# Patient Record
Sex: FEMALE | URBAN - METROPOLITAN AREA
[De-identification: names, ages, dates, MRNs, and addresses within clinical notes are randomized per-mention and may not be internally consistent; named-entity substitution may affect disease eponyms.]

---
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
## 2024-11-26 ENCOUNTER — OFFICE VISIT (OUTPATIENT)
Dept: PRIMARY CARE | Facility: CLINIC | Age: 59
End: 2024-11-26
Payer: MEDICAID

## 2024-11-26 VITALS
BODY MASS INDEX: 29.66 KG/M2 | HEIGHT: 67 IN | HEART RATE: 87 BPM | SYSTOLIC BLOOD PRESSURE: 126 MMHG | DIASTOLIC BLOOD PRESSURE: 80 MMHG | OXYGEN SATURATION: 96 % | WEIGHT: 189 LBS

## 2024-11-26 DIAGNOSIS — G43.909 MIGRAINE WITHOUT STATUS MIGRAINOSUS, NOT INTRACTABLE, UNSPECIFIED MIGRAINE TYPE: Primary | ICD-10-CM

## 2024-11-26 DIAGNOSIS — Z12.31 VISIT FOR SCREENING MAMMOGRAM: ICD-10-CM

## 2024-11-26 DIAGNOSIS — K21.9 GASTROESOPHAGEAL REFLUX DISEASE WITHOUT ESOPHAGITIS: ICD-10-CM

## 2024-11-26 DIAGNOSIS — Z01.419 WELL WOMAN EXAM: ICD-10-CM

## 2024-11-26 DIAGNOSIS — M54.2 NECK PAIN: ICD-10-CM

## 2024-11-26 DIAGNOSIS — K04.7 DENTAL INFECTION: ICD-10-CM

## 2024-11-26 DIAGNOSIS — F41.9 ANXIETY: ICD-10-CM

## 2024-11-26 PROCEDURE — 3008F BODY MASS INDEX DOCD: CPT | Performed by: PHYSICIAN ASSISTANT

## 2024-11-26 PROCEDURE — 99215 OFFICE O/P EST HI 40 MIN: CPT | Performed by: PHYSICIAN ASSISTANT

## 2024-11-26 PROCEDURE — 99205 OFFICE O/P NEW HI 60 MIN: CPT | Performed by: PHYSICIAN ASSISTANT

## 2024-11-26 PROCEDURE — 1036F TOBACCO NON-USER: CPT | Performed by: PHYSICIAN ASSISTANT

## 2024-11-26 RX ORDER — ACETAMINOPHEN 325 MG/1
325 TABLET ORAL EVERY 6 HOURS PRN
COMMUNITY

## 2024-11-26 RX ORDER — OMEPRAZOLE 40 MG/1
40 CAPSULE, DELAYED RELEASE ORAL
Qty: 90 CAPSULE | Refills: 1 | Status: SHIPPED | OUTPATIENT
Start: 2024-11-26 | End: 2025-05-25

## 2024-11-26 RX ORDER — OMEPRAZOLE 40 MG/1
40 CAPSULE, DELAYED RELEASE ORAL
COMMUNITY
End: 2024-11-26 | Stop reason: SDUPTHER

## 2024-11-26 RX ORDER — BUSPIRONE HYDROCHLORIDE 5 MG/1
5 TABLET ORAL 2 TIMES DAILY
Qty: 60 TABLET | Refills: 2 | Status: SHIPPED | OUTPATIENT
Start: 2024-11-26 | End: 2025-02-24

## 2024-11-26 RX ORDER — AMOXICILLIN 875 MG/1
875 TABLET, FILM COATED ORAL 2 TIMES DAILY
Qty: 20 TABLET | Refills: 0 | Status: SHIPPED | OUTPATIENT
Start: 2024-11-26 | End: 2024-12-06

## 2024-11-26 ASSESSMENT — ENCOUNTER SYMPTOMS
LOSS OF SENSATION IN FEET: 0
OCCASIONAL FEELINGS OF UNSTEADINESS: 0
DEPRESSION: 0

## 2024-11-26 ASSESSMENT — PATIENT HEALTH QUESTIONNAIRE - PHQ9
10. IF YOU CHECKED OFF ANY PROBLEMS, HOW DIFFICULT HAVE THESE PROBLEMS MADE IT FOR YOU TO DO YOUR WORK, TAKE CARE OF THINGS AT HOME, OR GET ALONG WITH OTHER PEOPLE: SOMEWHAT DIFFICULT
SUM OF ALL RESPONSES TO PHQ9 QUESTIONS 1 AND 2: 1
1. LITTLE INTEREST OR PLEASURE IN DOING THINGS: NOT AT ALL
2. FEELING DOWN, DEPRESSED OR HOPELESS: SEVERAL DAYS

## 2024-11-26 ASSESSMENT — COLUMBIA-SUICIDE SEVERITY RATING SCALE - C-SSRS
1. IN THE PAST MONTH, HAVE YOU WISHED YOU WERE DEAD OR WISHED YOU COULD GO TO SLEEP AND NOT WAKE UP?: NO
6. HAVE YOU EVER DONE ANYTHING, STARTED TO DO ANYTHING, OR PREPARED TO DO ANYTHING TO END YOUR LIFE?: NO
2. HAVE YOU ACTUALLY HAD ANY THOUGHTS OF KILLING YOURSELF?: NO

## 2024-11-26 ASSESSMENT — PAIN SCALES - GENERAL: PAINLEVEL_OUTOF10: 9

## 2024-12-09 ENCOUNTER — APPOINTMENT (OUTPATIENT)
Dept: PRIMARY CARE | Facility: CLINIC | Age: 59
End: 2024-12-09
Payer: MEDICAID
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
## 2024-12-24 ENCOUNTER — EVALUATION (OUTPATIENT)
Dept: PHYSICAL THERAPY | Facility: CLINIC | Age: 59
End: 2024-12-24
Payer: MEDICAID

## 2024-12-24 DIAGNOSIS — G89.29 CHRONIC BILATERAL THORACIC BACK PAIN: ICD-10-CM

## 2024-12-24 DIAGNOSIS — M54.2 NECK PAIN: Primary | ICD-10-CM

## 2024-12-24 DIAGNOSIS — M54.6 CHRONIC BILATERAL THORACIC BACK PAIN: ICD-10-CM

## 2024-12-24 PROCEDURE — 97162 PT EVAL MOD COMPLEX 30 MIN: CPT | Mod: GP | Performed by: PHYSICAL THERAPIST

## 2024-12-24 SDOH — ECONOMIC STABILITY: GENERAL: QUALITY OF LIFE: GOOD

## 2024-12-24 ASSESSMENT — ENCOUNTER SYMPTOMS
QUALITY: DISCOMFORT
QUALITY: THROBBING
QUALITY: TIGHT
QUALITY: RADIATING
QUALITY: DULL ACHE
PAIN SCALE: 5
QUALITY: SHARP
PAIN SCALE AT HIGHEST: 10
PAIN SCALE AT LOWEST: 4
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
## 2025-01-02 ENCOUNTER — OFFICE VISIT (OUTPATIENT)
Dept: PRIMARY CARE | Facility: CLINIC | Age: 60
End: 2025-01-02
Payer: MEDICAID

## 2025-01-02 ENCOUNTER — APPOINTMENT (OUTPATIENT)
Dept: OBSTETRICS AND GYNECOLOGY | Facility: CLINIC | Age: 60
End: 2025-01-02
Payer: MEDICAID

## 2025-01-02 VITALS
OXYGEN SATURATION: 96 % | HEART RATE: 93 BPM | BODY MASS INDEX: 29.63 KG/M2 | DIASTOLIC BLOOD PRESSURE: 72 MMHG | WEIGHT: 189.2 LBS | SYSTOLIC BLOOD PRESSURE: 102 MMHG

## 2025-01-02 DIAGNOSIS — K04.7 DENTAL INFECTION: ICD-10-CM

## 2025-01-02 DIAGNOSIS — K21.9 GASTROESOPHAGEAL REFLUX DISEASE WITHOUT ESOPHAGITIS: ICD-10-CM

## 2025-01-02 DIAGNOSIS — Z12.31 VISIT FOR SCREENING MAMMOGRAM: ICD-10-CM

## 2025-01-02 DIAGNOSIS — M54.2 NECK PAIN: ICD-10-CM

## 2025-01-02 DIAGNOSIS — F41.9 ANXIETY: ICD-10-CM

## 2025-01-02 DIAGNOSIS — L72.0 EPIDERMOID CYST OF FACE: ICD-10-CM

## 2025-01-02 DIAGNOSIS — G43.909 MIGRAINE WITHOUT STATUS MIGRAINOSUS, NOT INTRACTABLE, UNSPECIFIED MIGRAINE TYPE: Primary | ICD-10-CM

## 2025-01-02 DIAGNOSIS — D64.9 ANEMIA, UNSPECIFIED TYPE: ICD-10-CM

## 2025-01-02 DIAGNOSIS — Z23 NEED FOR TDAP VACCINATION: ICD-10-CM

## 2025-01-02 PROCEDURE — 1036F TOBACCO NON-USER: CPT | Performed by: PHYSICIAN ASSISTANT

## 2025-01-02 PROCEDURE — 99214 OFFICE O/P EST MOD 30 MIN: CPT | Performed by: PHYSICIAN ASSISTANT

## 2025-01-02 PROCEDURE — 99214 OFFICE O/P EST MOD 30 MIN: CPT | Mod: 25 | Performed by: PHYSICIAN ASSISTANT

## 2025-01-02 PROCEDURE — 90471 IMMUNIZATION ADMIN: CPT | Performed by: PHYSICIAN ASSISTANT

## 2025-01-02 ASSESSMENT — LIFESTYLE VARIABLES
HOW OFTEN DO YOU HAVE A DRINK CONTAINING ALCOHOL: NEVER
HOW MANY STANDARD DRINKS CONTAINING ALCOHOL DO YOU HAVE ON A TYPICAL DAY: PATIENT DOES NOT DRINK
HOW OFTEN DO YOU HAVE SIX OR MORE DRINKS ON ONE OCCASION: NEVER
AUDIT-C TOTAL SCORE: 0
SKIP TO QUESTIONS 9-10: 1

## 2025-01-02 ASSESSMENT — PATIENT HEALTH QUESTIONNAIRE - PHQ9
1. LITTLE INTEREST OR PLEASURE IN DOING THINGS: NOT AT ALL
2. FEELING DOWN, DEPRESSED OR HOPELESS: NOT AT ALL
SUM OF ALL RESPONSES TO PHQ9 QUESTIONS 1 AND 2: 0

## 2025-01-02 ASSESSMENT — ENCOUNTER SYMPTOMS
DEPRESSION: 0
OCCASIONAL FEELINGS OF UNSTEADINESS: 0
LOSS OF SENSATION IN FEET: 0

## 2025-01-02 ASSESSMENT — PAIN SCALES - GENERAL: PAINLEVEL_OUTOF10: 4

## 2025-01-06 ENCOUNTER — TREATMENT (OUTPATIENT)
Dept: PHYSICAL THERAPY | Facility: CLINIC | Age: 60
End: 2025-01-06
Payer: MEDICAID

## 2025-01-06 DIAGNOSIS — M54.2 NECK PAIN: ICD-10-CM

## 2025-01-06 DIAGNOSIS — M54.6 CHRONIC BILATERAL THORACIC BACK PAIN: ICD-10-CM

## 2025-01-06 DIAGNOSIS — G89.29 CHRONIC BILATERAL THORACIC BACK PAIN: ICD-10-CM

## 2025-01-06 PROCEDURE — 97110 THERAPEUTIC EXERCISES: CPT | Mod: GP,CQ

## 2025-01-06 PROCEDURE — 97140 MANUAL THERAPY 1/> REGIONS: CPT | Mod: GP,CQ

## 2025-01-06 ASSESSMENT — PAIN - FUNCTIONAL ASSESSMENT: PAIN_FUNCTIONAL_ASSESSMENT: 0-10

## 2025-01-06 ASSESSMENT — PAIN SCALES - GENERAL: PAINLEVEL_OUTOF10: 3

## 2025-01-06 NOTE — PROGRESS NOTES
"Physical Therapy Treatment    Patient Name: Merle Joshua  MRN: [id number]  Today's Date: 1/6/2025  Time Calculation  Start Time: 1430  Stop Time: 1515  Time Calculation (min): 45 min  PT Therapeutic Procedures Time Entry  Manual Therapy Time Entry: 30  Therapeutic Exercise Time Entry: 8    Insurance:  Visit number: 2 of 9  Authorization info: AUTH AFTER 30 VISITS / 100% COVERAGE / 30V   Insurance Type: Payor: [employer] NON-MEDICARE / Plan: [employer] NON-MEDICARE / Product Type: *No Product type* /     Current Problem   1. Neck pain  Follow Up In Physical Therapy      2. Chronic bilateral thoracic back pain  Follow Up In Physical Therapy          Subjective   General    Patient reports she has a headache today and has been having trouble sleeping d/t head and neck pain. Reports no adverse reactions to HEP. States she has trouble turning head to side<>side.       Precautions:   Anxiousness     Pain   Pain Assessment: 0-10  0-10 (Numeric) Pain Score: 3    Post Treatment Pain Level   \"I feel so much better\"    Objective   TTP to cervical structures    Treatments:  Therapeutic Exercise:  Therapeutic Exercise  Therapeutic Exercise Performed: Yes  Therapeutic Exercise Activity 1: UBE 3' fwd/bwd ea  Therapeutic Exercise Activity 2: cervical ISO chin tuck 10x2    Manual:  Manual Therapy  Manual Therapy Performed: Yes  Manual Therapy Activity 1: STM with light TPR to cervical structures at: upper traps, levator scaps, cervical paraspinals  Manual Therapy Activity 2: gentle subocciptial release    Cervical MHP post manual for relaxation      Assessment   Assessment:    Focus and emphasis on pain relief, reduction of muscle guarding around cervical spine to provide relief of tension and discomfort. Decreased pain after manual work. Very guarded and stiff movements at head noted.     Plan:    Manual, light cervical and scap strengthening, thoracic mobility    OP EDUCATION:   Use of heating pad, performance of " HEP    Goals:   Active       PT Problem       Pt will be 50% IND with HEP in 4 weeks in order to progress with therapy.        Start:  12/24/24    Expected End:  01/28/25            Pt will reduce pain levels to no more than 3/10 in 4 weeks in order to improve sleep and self care tasks.        Start:  12/24/24    Expected End:  01/28/25            Pt will demonstrate full functional cervical ROM in 4 weeks for self care tasks.       Start:  12/24/24    Expected End:  01/28/25            Pt will demonstrate subjective improvement of ADLs and recreational activities through improved score of 10 on NDI in 4 weeks.        Start:  12/24/24    Expected End:  01/28/25               PT Problem       Pt will be 100% IND with HEP in 8 weeks in order to maintain progress with therapy.         Start:  12/24/24    Expected End:  02/27/25            Pt will reduce pain levels to no more than 1/10 in 8 weeks in order to improve sleep, cooking/cleaning and self care tasks.        Start:  12/24/24    Expected End:  02/27/25            Pt will improve B scapular/GH strength to 5/5 in 8 weeks in order to improve strength required to lift objects at home including groceries and to improve cleaning tasks.         Start:  12/24/24    Expected End:  02/27/25            Pt will demonstrate subjective improvement of ADLs and recreational activities through improved score of 0 on NDI in 8 weeks.        Start:  12/24/24    Expected End:  02/27/25

## 2025-01-08 DIAGNOSIS — L72.0 EPIDERMOID CYST OF FACE: Primary | ICD-10-CM

## 2025-01-10 ENCOUNTER — LAB (OUTPATIENT)
Dept: LAB | Facility: LAB | Age: 60
End: 2025-01-10
Payer: MEDICAID

## 2025-01-10 ENCOUNTER — HOSPITAL ENCOUNTER (OUTPATIENT)
Dept: RADIOLOGY | Facility: HOSPITAL | Age: 60
Discharge: HOME | End: 2025-01-10
Payer: MEDICAID

## 2025-01-10 DIAGNOSIS — G43.909 MIGRAINE WITHOUT STATUS MIGRAINOSUS, NOT INTRACTABLE, UNSPECIFIED MIGRAINE TYPE: ICD-10-CM

## 2025-01-10 DIAGNOSIS — Z12.31 VISIT FOR SCREENING MAMMOGRAM: ICD-10-CM

## 2025-01-10 DIAGNOSIS — D64.9 ANEMIA, UNSPECIFIED TYPE: ICD-10-CM

## 2025-01-10 LAB
25(OH)D3 SERPL-MCNC: 24 NG/ML (ref 30–100)
ALBUMIN SERPL BCP-MCNC: 4.3 G/DL (ref 3.4–5)
ALP SERPL-CCNC: 81 U/L (ref 33–110)
ALT SERPL W P-5'-P-CCNC: 16 U/L (ref 7–45)
ANION GAP SERPL CALCULATED.3IONS-SCNC: 10 MMOL/L (ref 10–20)
AST SERPL W P-5'-P-CCNC: 18 U/L (ref 9–39)
BASOPHILS # BLD AUTO: 0.07 X10*3/UL (ref 0–0.1)
BASOPHILS NFR BLD AUTO: 1.1 %
BILIRUB SERPL-MCNC: 0.4 MG/DL (ref 0–1.2)
BUN SERPL-MCNC: 16 MG/DL (ref 6–23)
CALCIUM SERPL-MCNC: 9.6 MG/DL (ref 8.6–10.3)
CHLORIDE SERPL-SCNC: 104 MMOL/L (ref 98–107)
CHOLEST SERPL-MCNC: 177 MG/DL (ref 0–199)
CHOLEST/HDLC SERPL: 4.1 {RATIO}
CO2 SERPL-SCNC: 29 MMOL/L (ref 21–32)
CREAT SERPL-MCNC: 0.83 MG/DL (ref 0.5–1.05)
EGFRCR SERPLBLD CKD-EPI 2021: 82 ML/MIN/1.73M*2
EOSINOPHIL # BLD AUTO: 0.12 X10*3/UL (ref 0–0.7)
EOSINOPHIL NFR BLD AUTO: 1.8 %
ERYTHROCYTE [DISTWIDTH] IN BLOOD BY AUTOMATED COUNT: 13.5 % (ref 11.5–14.5)
FERRITIN SERPL-MCNC: 199 NG/ML (ref 8–150)
GLUCOSE SERPL-MCNC: 94 MG/DL (ref 74–99)
HCT VFR BLD AUTO: 36.2 % (ref 36–46)
HDLC SERPL-MCNC: 43.2 MG/DL
HGB BLD-MCNC: 11.8 G/DL (ref 12–16)
IMM GRANULOCYTES # BLD AUTO: 0.01 X10*3/UL (ref 0–0.7)
IMM GRANULOCYTES NFR BLD AUTO: 0.2 % (ref 0–0.9)
IRON SATN MFR SERPL: 27 % (ref 25–45)
IRON SERPL-MCNC: 95 UG/DL (ref 35–150)
LDLC SERPL CALC-MCNC: 111 MG/DL
LYMPHOCYTES # BLD AUTO: 2.14 X10*3/UL (ref 1.2–4.8)
LYMPHOCYTES NFR BLD AUTO: 32.1 %
MCH RBC QN AUTO: 28.7 PG (ref 26–34)
MCHC RBC AUTO-ENTMCNC: 32.6 G/DL (ref 32–36)
MCV RBC AUTO: 88 FL (ref 80–100)
MONOCYTES # BLD AUTO: 0.51 X10*3/UL (ref 0.1–1)
MONOCYTES NFR BLD AUTO: 7.7 %
NEUTROPHILS # BLD AUTO: 3.81 X10*3/UL (ref 1.2–7.7)
NEUTROPHILS NFR BLD AUTO: 57.1 %
NON HDL CHOLESTEROL: 134 MG/DL (ref 0–149)
NRBC BLD-RTO: 0 /100 WBCS (ref 0–0)
PLATELET # BLD AUTO: 297 X10*3/UL (ref 150–450)
POTASSIUM SERPL-SCNC: 4.3 MMOL/L (ref 3.5–5.3)
PROT SERPL-MCNC: 7 G/DL (ref 6.4–8.2)
RBC # BLD AUTO: 4.11 X10*6/UL (ref 4–5.2)
SODIUM SERPL-SCNC: 139 MMOL/L (ref 136–145)
TIBC SERPL-MCNC: 357 UG/DL (ref 240–445)
TRIGL SERPL-MCNC: 113 MG/DL (ref 0–149)
UIBC SERPL-MCNC: 262 UG/DL (ref 110–370)
VIT B12 SERPL-MCNC: 418 PG/ML (ref 211–911)
VLDL: 23 MG/DL (ref 0–40)
WBC # BLD AUTO: 6.7 X10*3/UL (ref 4.4–11.3)

## 2025-01-10 PROCEDURE — 83540 ASSAY OF IRON: CPT

## 2025-01-10 PROCEDURE — 80053 COMPREHEN METABOLIC PANEL: CPT

## 2025-01-10 PROCEDURE — 80061 LIPID PANEL: CPT

## 2025-01-10 PROCEDURE — 82306 VITAMIN D 25 HYDROXY: CPT

## 2025-01-10 PROCEDURE — 82728 ASSAY OF FERRITIN: CPT

## 2025-01-10 PROCEDURE — 85025 COMPLETE CBC W/AUTO DIFF WBC: CPT

## 2025-01-10 PROCEDURE — 83550 IRON BINDING TEST: CPT

## 2025-01-10 PROCEDURE — 77067 SCR MAMMO BI INCL CAD: CPT

## 2025-01-10 PROCEDURE — 82607 VITAMIN B-12: CPT

## 2025-01-13 ENCOUNTER — TREATMENT (OUTPATIENT)
Dept: PHYSICAL THERAPY | Facility: CLINIC | Age: 60
End: 2025-01-13
Payer: MEDICAID

## 2025-01-13 DIAGNOSIS — G89.29 CHRONIC BILATERAL THORACIC BACK PAIN: ICD-10-CM

## 2025-01-13 DIAGNOSIS — M54.2 NECK PAIN: ICD-10-CM

## 2025-01-13 DIAGNOSIS — M54.6 CHRONIC BILATERAL THORACIC BACK PAIN: ICD-10-CM

## 2025-01-13 PROCEDURE — 97110 THERAPEUTIC EXERCISES: CPT | Mod: GP | Performed by: PHYSICAL THERAPIST

## 2025-01-13 ASSESSMENT — PAIN SCALES - GENERAL: PAINLEVEL_OUTOF10: 6

## 2025-01-13 ASSESSMENT — PAIN - FUNCTIONAL ASSESSMENT: PAIN_FUNCTIONAL_ASSESSMENT: 0-10

## 2025-01-13 NOTE — PROGRESS NOTES
"Physical Therapy Treatment    Patient Name: Merle Joshua  MRN: [id number]  Today's Date: 1/13/2025  Time Calculation  Start Time: 0847  Stop Time: 0920  Time Calculation (min): 33 min  PT Therapeutic Procedures Time Entry  Therapeutic Exercise Time Entry: 33    Insurance:  Visit number: 3 of 9  Authorization info: AUTH AFTER 30 VISITS / 100% COVERAGE / 30V   Insurance Type: Payor: [employer]ICA[employer] / Plan: [employer] / Product Type: *No Product type* /     Current Problem   1. Neck pain  Follow Up In Physical Therapy      2. Chronic bilateral thoracic back pain  Follow Up In Physical Therapy          Subjective   General   General Comment: Pt presents reporting increased discomfort throughout upper thoracic into upper trap region.  Precautions:  Precautions  Precautions Comment: None  Pain   Pain Assessment: 0-10  0-10 (Numeric) Pain Score: 6  Post Treatment Pain Level 5    Objective   Overall tense quality of motion with reduction muscle control    Treatments:  Therapeutic Exercise:  Therapeutic Exercise  Therapeutic Exercise Performed: Yes  Therapeutic Exercise Activity 1: supine modified dead bug 10x  Therapeutic Exercise Activity 2: supine chin tuck 10x  Therapeutic Exercise Activity 3: supine scapular retraction 10x2  Therapeutic Exercise Activity 4: Supine PPT 10x2  Therapeutic Exercise Activity 5: seated bwd shoulder rolls 10x2  Therapeutic Exercise Activity 6: seated upper trap stretch 30\"x3 B  Therapeutic Exercise Activity 7: seated diaphragm breathing      Assessment   Assessment:    Pt presented with overall stiffness of motion with limitations secondary to pain despite gentle ROM activities performed this date, provided education on diaphragm breathing for pain control and nervous system regulation. Will continue with ROM and strengthening for stability reduced throughout cervical, GH, and thoracic regions.     Plan:    Trial of cervical traction?; Continue with " relaxation and diaphragm breathing and overall promotion of normal movement patterns     OP EDUCATION:   Diaphragm breathing for pain control     Goals:   Active       PT Problem       Pt will be 50% IND with HEP in 4 weeks in order to progress with therapy.        Start:  12/24/24    Expected End:  01/28/25            Pt will reduce pain levels to no more than 3/10 in 4 weeks in order to improve sleep and self care tasks.        Start:  12/24/24    Expected End:  01/28/25            Pt will demonstrate full functional cervical ROM in 4 weeks for self care tasks.       Start:  12/24/24    Expected End:  01/28/25            Pt will demonstrate subjective improvement of ADLs and recreational activities through improved score of 10 on NDI in 4 weeks.        Start:  12/24/24    Expected End:  01/28/25               PT Problem       Pt will be 100% IND with HEP in 8 weeks in order to maintain progress with therapy.         Start:  12/24/24    Expected End:  02/27/25            Pt will reduce pain levels to no more than 1/10 in 8 weeks in order to improve sleep, cooking/cleaning and self care tasks.        Start:  12/24/24    Expected End:  02/27/25            Pt will improve B scapular/GH strength to 5/5 in 8 weeks in order to improve strength required to lift objects at home including groceries and to improve cleaning tasks.         Start:  12/24/24    Expected End:  02/27/25            Pt will demonstrate subjective improvement of ADLs and recreational activities through improved score of 0 on NDI in 8 weeks.        Start:  12/24/24    Expected End:  02/27/25

## 2025-01-16 ENCOUNTER — APPOINTMENT (OUTPATIENT)
Dept: OBSTETRICS AND GYNECOLOGY | Facility: CLINIC | Age: 60
End: 2025-01-16
Payer: MEDICAID

## 2025-01-16 VITALS — DIASTOLIC BLOOD PRESSURE: 82 MMHG | SYSTOLIC BLOOD PRESSURE: 116 MMHG | WEIGHT: 192 LBS | BODY MASS INDEX: 30.07 KG/M2

## 2025-01-16 DIAGNOSIS — Z01.419 WELL WOMAN EXAM: ICD-10-CM

## 2025-01-16 ASSESSMENT — ENCOUNTER SYMPTOMS
EYES NEGATIVE: 0
CARDIOVASCULAR NEGATIVE: 0
NEUROLOGICAL NEGATIVE: 0
ALLERGIC/IMMUNOLOGIC NEGATIVE: 0
HEMATOLOGIC/LYMPHATIC NEGATIVE: 0
ENDOCRINE NEGATIVE: 0
PSYCHIATRIC NEGATIVE: 0
CONSTITUTIONAL NEGATIVE: 0
GASTROINTESTINAL NEGATIVE: 0
MUSCULOSKELETAL NEGATIVE: 0
RESPIRATORY NEGATIVE: 0

## 2025-01-16 NOTE — PROGRESS NOTES
Subjective   Merle Joshua is a 59 y.o. P6 ( x6) who presents for annual    Concerns today:  Feels like her vaginal odor is very responsive to food  No itching, burning, discomfort   No odor today    GynHx:  -Menses: post menopausal, no bleeding since, menopause early 50s  -Sexual Activity: not currently  -Sexual Concerns: no  -Contraception: N/A  -STI Screening: no  -Last pap:  Records reviewed on phone  NILM, HPV -  No history of abnormal paps    OBHx: P6,  x6  PMHx: migraines, GERD, anxiety    Objective   Physical Exam  Vitals:    25 0859   BP: 116/82      Gen: awake, alert  Head: NCAT  HEENT: moist mucus membranes  Pulm: breathing comfortably on room air  CV: warm and well-perfused  Abd: soft non distended  :   Normal vulvar exam  Mild vaginal atrophy  Normal cervix  Normal bimanual exam  Neuro: alert and oriented  Psych: appropriate affect     Assessment/Plan     Merle Joshua is a 59 y.o. P6 who presents today for an annual exam.     Health Maintenance  -Pap: due in   -Mammogram: up to date  -Colonoscopy: up to date, reports  normal in [place]  -Recent health maintenance labs with PCP  -Discussed normal vaginal odor and indy. If has episode of abnormal odor, can schedule follow up for vaginitis assessment.    RTC 1 year for annual    Shazia Bradford MD

## 2025-01-20 ENCOUNTER — TREATMENT (OUTPATIENT)
Dept: PHYSICAL THERAPY | Facility: CLINIC | Age: 60
End: 2025-01-20
Payer: MEDICAID

## 2025-01-20 DIAGNOSIS — M54.2 NECK PAIN: ICD-10-CM

## 2025-01-20 DIAGNOSIS — M54.6 CHRONIC BILATERAL THORACIC BACK PAIN: ICD-10-CM

## 2025-01-20 DIAGNOSIS — G89.29 CHRONIC BILATERAL THORACIC BACK PAIN: ICD-10-CM

## 2025-01-20 PROCEDURE — 97110 THERAPEUTIC EXERCISES: CPT | Mod: GP,CQ

## 2025-01-20 ASSESSMENT — PAIN - FUNCTIONAL ASSESSMENT: PAIN_FUNCTIONAL_ASSESSMENT: 0-10

## 2025-01-20 ASSESSMENT — PAIN SCALES - GENERAL: PAINLEVEL_OUTOF10: 1

## 2025-01-20 NOTE — PROGRESS NOTES
Physical Therapy Treatment    Patient Name: Merle Joshua  MRN: [id number]  Today's Date: 1/20/2025  Time Calculation  Start Time: 0915  Stop Time: 0955  Time Calculation (min): 40 min  PT Therapeutic Procedures Time Entry  Therapeutic Exercise Time Entry: 40    Insurance:  Visit number: 4 of 9  Authorization info: AUTH AFTER 30 VISITS / 100% COVERAGE / 30V   Insurance Type: Payor: [employer] MEDICAID / Plan: [employer] MEDICAID / Product Type: *No Product type* /     Current Problem   1. Neck pain  Follow Up In Physical Therapy      2. Chronic bilateral thoracic back pain  Follow Up In Physical Therapy          Subjective   General    Patient reports soreness in neck which radiates to L shoulder, states she continues to use OTC medication for pain. Reports she was sore after last session but feels good today.    Precautions:   none    Pain   Pain Assessment: 0-10  0-10 (Numeric) Pain Score: 1  Pain Location: Neck    Post Treatment Pain Level   No change    Objective   Improved ability to perform diaphragm breathing with little/no cues.     Posture: L shld elevation with rounded shld and slight winging of bilat scaps (L>R)    Treatments:  Therapeutic Exercise:  Therapeutic Exercise  Therapeutic Exercise Performed: Yes  Therapeutic Exercise Activity 1: h/l diaphragm breathing 10x3  Therapeutic Exercise Activity 2: h/l diaphragm breathing with PPT 10x3  Therapeutic Exercise Activity 3: h/l diaphragm breathing with ISO hip ADD 10x3  Therapeutic Exercise Activity 4: supine chin tuck 10x2  Therapeutic Exercise Activity 5: seated SB diaphragm breathing 10x3  Therapeutic Exercise Activity 6: seated SB bwd shld rolls 10x3  Therapeutic Exercise Activity 7: seated SB scap squeezes 10x3      Assessment   Assessment:    Patient is progressing towards goals, tolerated session well this date with no adverse reactions or increased pain. Focus on diaphragm breathing to reduce neural tension as well as basic  cervical and scapular movements to reduce tension/tenderness. Will continue to progress per supervising therapist.     Plan:    Trial of cervical traction?; Continue with relaxation and diaphragm breathing and overall promotion of normal movement patterns        OP EDUCATION:   Heat, rest, sophie breathing    Goals:   Active       PT Problem       Pt will be 50% IND with HEP in 4 weeks in order to progress with therapy.        Start:  12/24/24    Expected End:  01/28/25            Pt will reduce pain levels to no more than 3/10 in 4 weeks in order to improve sleep and self care tasks.        Start:  12/24/24    Expected End:  01/28/25            Pt will demonstrate full functional cervical ROM in 4 weeks for self care tasks.       Start:  12/24/24    Expected End:  01/28/25            Pt will demonstrate subjective improvement of ADLs and recreational activities through improved score of 10 on NDI in 4 weeks.        Start:  12/24/24    Expected End:  01/28/25               PT Problem       Pt will be 100% IND with HEP in 8 weeks in order to maintain progress with therapy.         Start:  12/24/24    Expected End:  02/27/25            Pt will reduce pain levels to no more than 1/10 in 8 weeks in order to improve sleep, cooking/cleaning and self care tasks.        Start:  12/24/24    Expected End:  02/27/25            Pt will improve B scapular/GH strength to 5/5 in 8 weeks in order to improve strength required to lift objects at home including groceries and to improve cleaning tasks.         Start:  12/24/24    Expected End:  02/27/25            Pt will demonstrate subjective improvement of ADLs and recreational activities through improved score of 0 on NDI in 8 weeks.        Start:  12/24/24    Expected End:  02/27/25

## 2025-01-27 ENCOUNTER — TREATMENT (OUTPATIENT)
Dept: PHYSICAL THERAPY | Facility: CLINIC | Age: 60
End: 2025-01-27
Payer: MEDICAID

## 2025-01-27 DIAGNOSIS — M54.6 CHRONIC BILATERAL THORACIC BACK PAIN: ICD-10-CM

## 2025-01-27 DIAGNOSIS — G89.29 CHRONIC BILATERAL THORACIC BACK PAIN: ICD-10-CM

## 2025-01-27 DIAGNOSIS — M54.2 NECK PAIN: ICD-10-CM

## 2025-01-27 PROCEDURE — 97110 THERAPEUTIC EXERCISES: CPT | Mod: GP,CQ

## 2025-01-27 ASSESSMENT — PAIN SCALES - GENERAL: PAINLEVEL_OUTOF10: 2

## 2025-01-27 ASSESSMENT — PAIN - FUNCTIONAL ASSESSMENT: PAIN_FUNCTIONAL_ASSESSMENT: 0-10

## 2025-01-27 NOTE — PROGRESS NOTES
"Physical Therapy Treatment    Patient Name: Merle Joshua  MRN: [id number]  Today's Date: 1/27/2025  Time Calculation  Start Time: 0830  Stop Time: 0914  Time Calculation (min): 44 min  PT Therapeutic Procedures Time Entry  Therapeutic Exercise Time Entry: 44    Insurance:  Visit number: 5 of 9  Authorization info: AUTH AFTER 30 VISITS / 100% COVERAGE / 30V   Insurance Type: Payor: [employer] MEDICAID / Plan: [employer] MEDICAID / Product Type: *No Product type* /     Current Problem   1. Neck pain  Follow Up In Physical Therapy      2. Chronic bilateral thoracic back pain  Follow Up In Physical Therapy          Subjective   General    Patient reports she felt \"good and relaxed\" after last session, states she has slight pressure in her head but thinks ultimately she is progressing.  Doing well with HEP.    Precautions:   None    Pain   Pain Assessment: 0-10  0-10 (Numeric) Pain Score: 2  Pain Location:  (head and neck)    Post Treatment Pain Level   0/10- \"that pressure in my head is gone\"    Objective   Tightness to bilat upper traps     Treatments:  Therapeutic Exercise:  Therapeutic Exercise  Therapeutic Exercise Performed: Yes  Therapeutic Exercise Activity 1: UBE 3' fwd/3' bwd  Therapeutic Exercise Activity 2: seated UT stretch shld distract 3 x 30\" H B  Therapeutic Exercise Activity 3: h/l diaphragm breathing with ISO hip ADD 10x3  Therapeutic Exercise Activity 4: h/l diaphragm breathing with alt MIP 10x2  Therapeutic Exercise Activity 5: seated SB diaphragm breathing with PPT 10x3  Therapeutic Exercise Activity 6: seated SB diaphragm breathing with PPT 10x3  Therapeutic Exercise Activity 7: seated SB scap squeezes 10x3      Assessment   Assessment:    Patient seems to really be finding benefit from PT interventions, no pain symptoms after interventions today with improved ability to perform diaphragmatic breathing during each task. Continued stiffness with movements but able to perform. " Will continue to progress per patient tolerance.    Plan:    See above    OP EDUCATION:   Completion of HEP 2x per day, use of heat      Goals:   Active       PT Problem       Pt will be 50% IND with HEP in 4 weeks in order to progress with therapy.        Start:  12/24/24    Expected End:  01/28/25            Pt will reduce pain levels to no more than 3/10 in 4 weeks in order to improve sleep and self care tasks.        Start:  12/24/24    Expected End:  01/28/25            Pt will demonstrate full functional cervical ROM in 4 weeks for self care tasks.       Start:  12/24/24    Expected End:  01/28/25            Pt will demonstrate subjective improvement of ADLs and recreational activities through improved score of 10 on NDI in 4 weeks.        Start:  12/24/24    Expected End:  01/28/25               PT Problem       Pt will be 100% IND with HEP in 8 weeks in order to maintain progress with therapy.         Start:  12/24/24    Expected End:  02/27/25            Pt will reduce pain levels to no more than 1/10 in 8 weeks in order to improve sleep, cooking/cleaning and self care tasks.        Start:  12/24/24    Expected End:  02/27/25            Pt will improve B scapular/GH strength to 5/5 in 8 weeks in order to improve strength required to lift objects at home including groceries and to improve cleaning tasks.         Start:  12/24/24    Expected End:  02/27/25            Pt will demonstrate subjective improvement of ADLs and recreational activities through improved score of 0 on NDI in 8 weeks.        Start:  12/24/24    Expected End:  02/27/25

## 2025-02-03 ENCOUNTER — TREATMENT (OUTPATIENT)
Dept: PHYSICAL THERAPY | Facility: CLINIC | Age: 60
End: 2025-02-03
Payer: MEDICAID

## 2025-02-03 DIAGNOSIS — G89.29 CHRONIC BILATERAL THORACIC BACK PAIN: ICD-10-CM

## 2025-02-03 DIAGNOSIS — M54.2 NECK PAIN: ICD-10-CM

## 2025-02-03 DIAGNOSIS — M54.6 CHRONIC BILATERAL THORACIC BACK PAIN: ICD-10-CM

## 2025-02-03 PROCEDURE — 97110 THERAPEUTIC EXERCISES: CPT | Mod: GP | Performed by: PHYSICAL THERAPIST

## 2025-02-03 ASSESSMENT — PAIN - FUNCTIONAL ASSESSMENT: PAIN_FUNCTIONAL_ASSESSMENT: 0-10

## 2025-02-03 ASSESSMENT — PAIN SCALES - GENERAL: PAINLEVEL_OUTOF10: 2

## 2025-02-03 ASSESSMENT — PAIN DESCRIPTION - DESCRIPTORS: DESCRIPTORS: TIGHTNESS

## 2025-02-03 NOTE — PROGRESS NOTES
"Physical Therapy Treatment    Patient Name: Merle Joshua  MRN: [id number]  Today's Date: 2/3/2025  Time Calculation  Start Time: 0839  Stop Time: 0918  Time Calculation (min): 39 min  PT Therapeutic Procedures Time Entry  Therapeutic Exercise Time Entry: 39    Insurance:  Visit number: 6 of 9  Authorization info: AUTH AFTER 30 VISITS / 100% COVERAGE / 30V   Insurance Type: Payor: [employer] MEDICAID / Plan: [employer] MEDICAID / Product Type: *No Product type* /     Current Problem   1. Neck pain  Follow Up In Physical Therapy      2. Chronic bilateral thoracic back pain  Follow Up In Physical Therapy          Subjective   General   General Comment: Pt reports she is feeling really good only minor stiffness this date.  Precautions:  Precautions  Precautions Comment: None  Pain   Pain Assessment: 0-10  0-10 (Numeric) Pain Score: 2  Pain Descriptors: Tightness  Post Treatment Pain Level 1    Objective   Good form with diaphragm breathing     Treatments:  Therapeutic Exercise:  Therapeutic Exercise  Therapeutic Exercise Performed: Yes  Therapeutic Exercise Activity 1: UBE 3' fwd/3' bwd  Therapeutic Exercise Activity 2: seated UT stretch shld distract 3 x 30\" H B  Therapeutic Exercise Activity 3: Standing SA row lime TB 10x3  Therapeutic Exercise Activity 4: Standing LA row lime TB 10x3  Therapeutic Exercise Activity 5: standing B ER lime TB 10x3  Therapeutic Exercise Activity 6: seated SB roll out stretch 20x  Therapeutic Exercise Activity 7: seated dead bug with diaphragm breathing 10x3  Therapeutic Exercise Activity 8: seated SLR with diaphragm breathing  Therapeutic Exercise Activity 9: seated figure 4 stretch 30\"x3      Assessment   Assessment:    Pt tolerated session well focus on overall stability tasks with diaphragm breathing and gentle progression of resistive work. Will continue with overall progression and relaxation techniques as tolerated.     Plan:    Continue with gentle strength " training tasks     OP EDUCATION:   Continue with established HEP     HEP/Access Codes: No change     Goals:   Active       PT Problem       Pt will be 50% IND with HEP in 4 weeks in order to progress with therapy.        Start:  12/24/24    Expected End:  01/28/25            Pt will reduce pain levels to no more than 3/10 in 4 weeks in order to improve sleep and self care tasks.        Start:  12/24/24    Expected End:  01/28/25            Pt will demonstrate full functional cervical ROM in 4 weeks for self care tasks.       Start:  12/24/24    Expected End:  01/28/25            Pt will demonstrate subjective improvement of ADLs and recreational activities through improved score of 10 on NDI in 4 weeks.        Start:  12/24/24    Expected End:  01/28/25               PT Problem       Pt will be 100% IND with HEP in 8 weeks in order to maintain progress with therapy.         Start:  12/24/24    Expected End:  02/27/25            Pt will reduce pain levels to no more than 1/10 in 8 weeks in order to improve sleep, cooking/cleaning and self care tasks.        Start:  12/24/24    Expected End:  02/27/25            Pt will improve B scapular/GH strength to 5/5 in 8 weeks in order to improve strength required to lift objects at home including groceries and to improve cleaning tasks.         Start:  12/24/24    Expected End:  02/27/25            Pt will demonstrate subjective improvement of ADLs and recreational activities through improved score of 0 on NDI in 8 weeks.        Start:  12/24/24    Expected End:  02/27/25

## 2025-02-10 ENCOUNTER — TREATMENT (OUTPATIENT)
Dept: PHYSICAL THERAPY | Facility: CLINIC | Age: 60
End: 2025-02-10
Payer: MEDICAID

## 2025-02-10 DIAGNOSIS — M54.2 NECK PAIN: ICD-10-CM

## 2025-02-10 DIAGNOSIS — M54.6 CHRONIC BILATERAL THORACIC BACK PAIN: ICD-10-CM

## 2025-02-10 DIAGNOSIS — G89.29 CHRONIC BILATERAL THORACIC BACK PAIN: ICD-10-CM

## 2025-02-10 PROCEDURE — 97110 THERAPEUTIC EXERCISES: CPT | Mod: GP | Performed by: PHYSICAL THERAPIST

## 2025-02-10 NOTE — PROGRESS NOTES
"Physical Therapy Treatment    Patient Name: Merle Joshua  MRN: [id number]  Today's Date: 2/10/2025  Time Calculation  Start Time: 0931  Stop Time: 1015  Time Calculation (min): 44 min  PT Therapeutic Procedures Time Entry  Therapeutic Exercise Time Entry: 40    Insurance:  Visit number: 7 of 9  Authorization info: AUTH AFTER 30 VISITS / 100% COVERAGE / 30V   Insurance Type: Payor: [employer] MEDICAID / Plan: [employer] MEDICAID / Product Type: *No Product type* /     Current Problem   1. Neck pain  Follow Up In Physical Therapy      2. Chronic bilateral thoracic back pain  Follow Up In Physical Therapy          Subjective   General   General Comment: Pt reports she is feeling better, no pain this date. Yesterday had some intermittent discomfort if she turns too fast.  Precautions:  Precautions  Precautions Comment: None  Pain    0  Post Treatment Pain Level 2    Objective   Upper trap compensation noted     Treatments:  Therapeutic Exercise:  Therapeutic Exercise  Therapeutic Exercise Performed: Yes  Therapeutic Exercise Activity 1: UBE 3' fwd/3' bwd  Therapeutic Exercise Activity 2: supine chin tuck 10x3  Therapeutic Exercise Activity 3: supine chin tuck with rotation 10x3  Therapeutic Exercise Activity 4: supine horizontal abd B GH no weight 10x3  Therapeutic Exercise Activity 5: supine alt arm raise 10x3  Therapeutic Exercise Activity 6: seated UT stretch shld distract 3 x 30\" H B  Therapeutic Exercise Activity 7: Diaphargm breathing with MHP on cervical for relaxation techniques.    MHP applied due to cervical discomfort     Assessment   Assessment:    PT challenged with chin tuck activities noted compensation of upper trap compensation and increased symptoms. Use of moist hot pack following exercises due to cervical discomfort with exercise with instruction of diaphragm breathing for overall nervous system regulation and pain control.     Plan:    Continue with overall strengthening "     OP EDUCATION:   Use of heat     Goals:   Active       PT Problem       Pt will be 50% IND with HEP in 4 weeks in order to progress with therapy.        Start:  12/24/24    Expected End:  01/28/25            Pt will reduce pain levels to no more than 3/10 in 4 weeks in order to improve sleep and self care tasks.        Start:  12/24/24    Expected End:  01/28/25            Pt will demonstrate full functional cervical ROM in 4 weeks for self care tasks.       Start:  12/24/24    Expected End:  01/28/25            Pt will demonstrate subjective improvement of ADLs and recreational activities through improved score of 10 on NDI in 4 weeks.        Start:  12/24/24    Expected End:  01/28/25               PT Problem       Pt will be 100% IND with HEP in 8 weeks in order to maintain progress with therapy.         Start:  12/24/24    Expected End:  02/27/25            Pt will reduce pain levels to no more than 1/10 in 8 weeks in order to improve sleep, cooking/cleaning and self care tasks.        Start:  12/24/24    Expected End:  02/27/25            Pt will improve B scapular/GH strength to 5/5 in 8 weeks in order to improve strength required to lift objects at home including groceries and to improve cleaning tasks.         Start:  12/24/24    Expected End:  02/27/25            Pt will demonstrate subjective improvement of ADLs and recreational activities through improved score of 0 on NDI in 8 weeks.        Start:  12/24/24    Expected End:  02/27/25

## 2025-02-17 ENCOUNTER — DOCUMENTATION (OUTPATIENT)
Dept: PHYSICAL THERAPY | Facility: CLINIC | Age: 60
End: 2025-02-17
Payer: MEDICAID

## 2025-02-17 ENCOUNTER — APPOINTMENT (OUTPATIENT)
Dept: PHYSICAL THERAPY | Facility: CLINIC | Age: 60
End: 2025-02-17
Payer: MEDICAID

## 2025-02-17 NOTE — PROGRESS NOTES
Physical Therapy                 Therapy Communication Note    Patient Name: Merle Joshua  MRN: [id number]  Department:   Room: Room/bed info not found  Today's Date: 2/17/2025     Discipline: Physical Therapy          Missed Visit Reason:  cancelled appt in [facility]    Missed Time: Cancel    Comment:

## 2025-02-24 ENCOUNTER — APPOINTMENT (OUTPATIENT)
Dept: PHYSICAL THERAPY | Facility: CLINIC | Age: 60
End: 2025-02-24
Payer: MEDICAID

## 2025-02-25 ENCOUNTER — DOCUMENTATION (OUTPATIENT)
Dept: PHYSICAL THERAPY | Facility: CLINIC | Age: 60
End: 2025-02-25

## 2025-02-25 ENCOUNTER — APPOINTMENT (OUTPATIENT)
Dept: PHYSICAL THERAPY | Facility: CLINIC | Age: 60
End: 2025-02-25
Payer: MEDICAID

## 2025-02-25 NOTE — PROGRESS NOTES
Physical Therapy                 Therapy Communication Note    Patient Name: Merle Joshua  MRN: [id number]  Department:   Room: Room/bed info not found  Today's Date: 2/25/2025     Discipline: Physical Therapy          Missed Visit Reason:  Sick     Missed Time: Cancel    Comment:
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
## 2025-03-04 PROBLEM — R79.89 ELEVATED FERRITIN: Status: ACTIVE | Noted: 2025-03-04

## 2025-03-04 PROBLEM — L72.0 EPIDERMOID CYST OF FACE: Status: ACTIVE | Noted: 2025-03-04
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
## 2025-03-11 ENCOUNTER — APPOINTMENT (OUTPATIENT)
Dept: DERMATOLOGY | Facility: CLINIC | Age: 60
End: 2025-03-11
Payer: MEDICAID

## 2025-03-12 ENCOUNTER — APPOINTMENT (OUTPATIENT)
Dept: PHYSICAL THERAPY | Facility: CLINIC | Age: 60
End: 2025-03-12
Payer: MEDICAID

## 2025-03-12 ENCOUNTER — DOCUMENTATION (OUTPATIENT)
Dept: PHYSICAL THERAPY | Facility: CLINIC | Age: 60
End: 2025-03-12
Payer: MEDICAID

## 2025-03-12 DIAGNOSIS — M54.6 CHRONIC BILATERAL THORACIC BACK PAIN: ICD-10-CM

## 2025-03-12 DIAGNOSIS — M54.2 NECK PAIN: ICD-10-CM

## 2025-03-12 DIAGNOSIS — G89.29 CHRONIC BILATERAL THORACIC BACK PAIN: ICD-10-CM

## 2025-03-12 PROCEDURE — 97110 THERAPEUTIC EXERCISES: CPT | Mod: GP | Performed by: PHYSICAL THERAPIST

## 2025-03-12 PROCEDURE — 97530 THERAPEUTIC ACTIVITIES: CPT | Mod: GP | Performed by: PHYSICAL THERAPIST

## 2025-03-12 ASSESSMENT — PAIN SCALES - GENERAL: PAINLEVEL_OUTOF10: 0 - NO PAIN

## 2025-03-12 ASSESSMENT — PAIN - FUNCTIONAL ASSESSMENT: PAIN_FUNCTIONAL_ASSESSMENT: 0-10

## 2025-03-12 NOTE — PROGRESS NOTES
Physical Therapy Treatment    Patient Name: Merle Joshua  MRN: [id number]  Today's Date: 3/12/2025  Time Calculation  Start Time: 0945  Stop Time: 1020  Time Calculation (min): 35 min  PT Therapeutic Procedures Time Entry  Therapeutic Exercise Time Entry: 15  Therapeutic Activity Time Entry: 20    Progress Note: 12/24/24-03/12/25    Insurance:  Visit number: 8 of 16  Authorization info: AUTH AFTER 30 VISITS / 100% COVERAGE / 30V   Insurance Type: Payor: [employer] MEDICAID / Plan: [employer] MEDICAID / Product Type: *No Product type* /   Insurance Type: Payor: [employer] MEDICAID / Plan: [employer] MEDICAID / Product Type: *No Product type* /     Current Problem   1. Neck pain  Follow Up In Physical Therapy      2. Chronic bilateral thoracic back pain  Follow Up In Physical Therapy          Subjective   General   General Comment: Pt enters therapy stating that she has been out of therapy due to weather and not feeling well. She is upset that the insurance is being billed compared through [facility]. This office did contact patient and insurance appears to be appropriate in system. She reports that she is having burning within her shoulders but no known cause of the flare-up. She is still having problems with sleep but overall today feels well.  Precautions:  Precautions  Precautions Comment: None  Pain   Pain Assessment: 0-10  0-10 (Numeric) Pain Score: 0 - No pain  Post Treatment Pain Level 0    Objective   NDI: 25    Cervical AROM:   Resting position 10 degrees of upper cervical extension  Flexion - 25  Rotation - 20 B with extension compensation    Cervical PROM in supine: able to achieve full ROM despite discomfort during motion especially R rotation and R lateral flexion     Palpation: paraspinals C7-T4 with noted tension and hypomobility     Treatments:  Therapeutic Exercise:  Therapeutic Exercise  Therapeutic Exercise Performed: Yes  Therapeutic Exercise Activity 1: seated B  shoulder ABD with diaphragm breathing at top of motion 10x  Therapeutic Exercise Activity 2: seated PPT 10x3  Therapeutic activity:  Therapeutic Activity  Therapeutic Activity Performed: Yes  Therapeutic Activity 1: Reassessment this date see objective measures  Therapeutic Activity 2: Educated on different therapy interventions that can be utilized to help with overall discomfort      Assessment   Assessment:    Pt continues to be challenged with cervical AROM due to muscle involvement as PROM demonstrated normal mobility. Significant tension and tenderness along cervicothroacic junction and upper thoracic. Continues to have reduced postural control throughout thoracic, cervical and lumbar regions. Will continue for another 8 weeks to progression postural control, thoracic mobiltiy and reduced pain.     Plan:    1x/week for 6 weeks   Cupping next session for soft tissue mobility  Thoracic mobility   Assess pelvic region.     OP EDUCATION:   See above     Goals:   Active       PT Problem       Pt will be 50% IND with HEP in 4 weeks in order to progress with therapy.        Start:  12/24/24    Expected End:  01/28/25            Pt will reduce pain levels to no more than 3/10 in 4 weeks in order to improve sleep and self care tasks.        Start:  12/24/24    Expected End:  01/28/25            Pt will demonstrate full functional cervical ROM in 4 weeks for self care tasks.       Start:  12/24/24    Expected End:  01/28/25            Pt will demonstrate subjective improvement of ADLs and recreational activities through improved score of 10 on NDI in 4 weeks.        Start:  12/24/24    Expected End:  01/28/25               PT Problem       Pt will be 100% IND with HEP in 8 weeks in order to maintain progress with therapy.         Start:  12/24/24    Expected End:  02/27/25            Pt will reduce pain levels to no more than 1/10 in 8 weeks in order to improve sleep, cooking/cleaning and self care tasks.        Start:   12/24/24    Expected End:  02/27/25            Pt will improve B scapular/GH strength to 5/5 in 8 weeks in order to improve strength required to lift objects at home including groceries and to improve cleaning tasks.         Start:  12/24/24    Expected End:  02/27/25            Pt will demonstrate subjective improvement of ADLs and recreational activities through improved score of 0 on NDI in 8 weeks.        Start:  12/24/24    Expected End:  02/27/25

## 2025-03-13 ENCOUNTER — APPOINTMENT (OUTPATIENT)
Dept: PRIMARY CARE | Facility: CLINIC | Age: 60
End: 2025-03-13
Payer: MEDICARE

## 2025-03-13 DIAGNOSIS — F41.9 ANXIETY: ICD-10-CM

## 2025-03-13 DIAGNOSIS — G43.909 MIGRAINE WITHOUT STATUS MIGRAINOSUS, NOT INTRACTABLE, UNSPECIFIED MIGRAINE TYPE: ICD-10-CM

## 2025-03-13 DIAGNOSIS — D64.9 ANEMIA, UNSPECIFIED TYPE: ICD-10-CM
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
## 2025-03-13 DIAGNOSIS — L72.0 EPIDERMOID CYST OF FACE: ICD-10-CM

## 2025-03-13 DIAGNOSIS — R79.89 ELEVATED FERRITIN: ICD-10-CM

## 2025-03-13 DIAGNOSIS — M54.2 NECK PAIN: Primary | ICD-10-CM

## 2025-03-13 DIAGNOSIS — K21.9 GASTROESOPHAGEAL REFLUX DISEASE WITHOUT ESOPHAGITIS: ICD-10-CM
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
## 2025-03-20 ENCOUNTER — OFFICE VISIT (OUTPATIENT)
Dept: PRIMARY CARE | Facility: CLINIC | Age: 60
End: 2025-03-20
Payer: MEDICAID

## 2025-03-20 VITALS
SYSTOLIC BLOOD PRESSURE: 90 MMHG | DIASTOLIC BLOOD PRESSURE: 62 MMHG | WEIGHT: 192.2 LBS | OXYGEN SATURATION: 98 % | BODY MASS INDEX: 30.1 KG/M2 | HEART RATE: 80 BPM

## 2025-03-20 DIAGNOSIS — M54.2 NECK PAIN: Primary | ICD-10-CM

## 2025-03-20 DIAGNOSIS — G89.29 CHRONIC BILATERAL LOW BACK PAIN WITHOUT SCIATICA: ICD-10-CM

## 2025-03-20 DIAGNOSIS — M54.50 CHRONIC BILATERAL LOW BACK PAIN WITHOUT SCIATICA: ICD-10-CM

## 2025-03-20 DIAGNOSIS — G43.909 MIGRAINE WITHOUT STATUS MIGRAINOSUS, NOT INTRACTABLE, UNSPECIFIED MIGRAINE TYPE: ICD-10-CM

## 2025-03-20 DIAGNOSIS — E66.9 OBESITY (BMI 30-39.9): ICD-10-CM
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
## 2025-03-20 DIAGNOSIS — D64.9 ANEMIA, UNSPECIFIED TYPE: ICD-10-CM

## 2025-03-20 DIAGNOSIS — K21.9 GASTROESOPHAGEAL REFLUX DISEASE WITHOUT ESOPHAGITIS: ICD-10-CM

## 2025-03-20 DIAGNOSIS — R79.89 ELEVATED FERRITIN: ICD-10-CM

## 2025-03-20 DIAGNOSIS — L72.0 EPIDERMOID CYST OF FACE: ICD-10-CM

## 2025-03-20 DIAGNOSIS — F41.9 ANXIETY: ICD-10-CM

## 2025-03-20 PROCEDURE — 99214 OFFICE O/P EST MOD 30 MIN: CPT | Performed by: PHYSICIAN ASSISTANT

## 2025-03-20 PROCEDURE — 1036F TOBACCO NON-USER: CPT | Performed by: PHYSICIAN ASSISTANT

## 2025-03-20 RX ORDER — MELOXICAM 15 MG/1
15 TABLET ORAL DAILY
Qty: 30 TABLET | Refills: 1 | Status: SHIPPED | OUTPATIENT
Start: 2025-03-20 | End: 2025-05-19

## 2025-03-20 RX ORDER — HYDROXYZINE HYDROCHLORIDE 25 MG/1
25 TABLET, FILM COATED ORAL EVERY 8 HOURS PRN
Qty: 90 TABLET | Refills: 2 | Status: SHIPPED | OUTPATIENT
Start: 2025-03-20 | End: 2025-06-18

## 2025-03-20 ASSESSMENT — PATIENT HEALTH QUESTIONNAIRE - PHQ9
2. FEELING DOWN, DEPRESSED OR HOPELESS: NOT AT ALL
SUM OF ALL RESPONSES TO PHQ9 QUESTIONS 1 AND 2: 0
1. LITTLE INTEREST OR PLEASURE IN DOING THINGS: NOT AT ALL

## 2025-03-20 ASSESSMENT — ENCOUNTER SYMPTOMS
DEPRESSION: 0
LOSS OF SENSATION IN FEET: 0
OCCASIONAL FEELINGS OF UNSTEADINESS: 0

## 2025-03-20 ASSESSMENT — LIFESTYLE VARIABLES
HOW OFTEN DO YOU HAVE A DRINK CONTAINING ALCOHOL: NEVER
HOW OFTEN DO YOU HAVE SIX OR MORE DRINKS ON ONE OCCASION: NEVER
AUDIT-C TOTAL SCORE: 0
SKIP TO QUESTIONS 9-10: 1
HOW MANY STANDARD DRINKS CONTAINING ALCOHOL DO YOU HAVE ON A TYPICAL DAY: PATIENT DOES NOT DRINK

## 2025-03-20 ASSESSMENT — PAIN SCALES - GENERAL: PAINLEVEL_OUTOF10: 5

## 2025-03-25 ENCOUNTER — TREATMENT (OUTPATIENT)
Dept: PHYSICAL THERAPY | Facility: CLINIC | Age: 60
End: 2025-03-25
Payer: MEDICAID

## 2025-03-25 DIAGNOSIS — G89.29 CHRONIC BILATERAL THORACIC BACK PAIN: ICD-10-CM

## 2025-03-25 DIAGNOSIS — M54.2 NECK PAIN: ICD-10-CM

## 2025-03-25 DIAGNOSIS — M54.6 CHRONIC BILATERAL THORACIC BACK PAIN: ICD-10-CM

## 2025-03-25 PROCEDURE — 97110 THERAPEUTIC EXERCISES: CPT | Mod: GP | Performed by: PHYSICAL THERAPIST

## 2025-03-25 NOTE — PROGRESS NOTES
Physical Therapy Treatment    Patient Name: Merle Joshua  MRN: [id number]  Today's Date: 3/25/2025  Time Calculation  Start Time: 0730  Stop Time: 0810  Time Calculation (min): 40 min  PT Therapeutic Procedures Time Entry  Therapeutic Exercise Time Entry: 40      Insurance:  Visit number: 9 of 16  Authorization info: AUTH AFTER 30 VISITS / 100% COVERAGE / 30V   Insurance Type: Payor: [employer] MEDICAID / Plan: [employer] MEDICAID / Product Type: *No Product type* /   Insurance Type: Payor: [employer] MEDICAID / Plan: [employer] MEDICAID / Product Type: *No Product type* /     Current Problem   1. Neck pain  Follow Up In Physical Therapy      2. Chronic bilateral thoracic back pain  Follow Up In Physical Therapy          Subjective   General    Pt reports her neck is feeling well but she continues to have discomfort along shoulder blades, compliant with HEP. Went to see MD who suggested pain management.   Precautions:   None   Pain    3  Post Treatment Pain Level 0    Objective     Treatments:  Therapeutic Exercise:   UEB 3/3 fwd/bwd   Supine on half bolster:   - diaphragm breathing with arms at side 3'   - PPT 10x3    - B UE raise 10x2    - alt UE raise 10x2 B   - snow eleazar 10x2   - horizontal abd 10x2       Assessment   Assessment:    Pt tolerated session well focus on thoracic mobility with use of half bolster and overall motion, will incorporate cupping next session for soft tissue mobility.     Plan:    Cupping next session for soft tissue mobility  Thoracic mobility   Assess pelvic region.     OP EDUCATION:   See above     Goals:   Active       PT Problem       Pt will be 50% IND with HEP in 4 weeks in order to progress with therapy.        Start:  12/24/24    Expected End:  01/28/25            Pt will reduce pain levels to no more than 3/10 in 4 weeks in order to improve sleep and self care tasks.        Start:  12/24/24    Expected End:  01/28/25            Pt will  demonstrate full functional cervical ROM in 4 weeks for self care tasks.       Start:  12/24/24    Expected End:  01/28/25            Pt will demonstrate subjective improvement of ADLs and recreational activities through improved score of 10 on NDI in 4 weeks.        Start:  12/24/24    Expected End:  01/28/25               PT Problem       Pt will be 100% IND with HEP in 8 weeks in order to maintain progress with therapy.         Start:  12/24/24    Expected End:  02/27/25            Pt will reduce pain levels to no more than 1/10 in 8 weeks in order to improve sleep, cooking/cleaning and self care tasks.        Start:  12/24/24    Expected End:  02/27/25            Pt will improve B scapular/GH strength to 5/5 in 8 weeks in order to improve strength required to lift objects at home including groceries and to improve cleaning tasks.         Start:  12/24/24    Expected End:  02/27/25            Pt will demonstrate subjective improvement of ADLs and recreational activities through improved score of 0 on NDI in 8 weeks.        Start:  12/24/24    Expected End:  02/27/25

## 2025-03-31 ENCOUNTER — TREATMENT (OUTPATIENT)
Dept: PHYSICAL THERAPY | Facility: CLINIC | Age: 60
End: 2025-03-31
Payer: MEDICAID

## 2025-03-31 DIAGNOSIS — G89.29 CHRONIC BILATERAL THORACIC BACK PAIN: ICD-10-CM

## 2025-03-31 DIAGNOSIS — M54.6 CHRONIC BILATERAL THORACIC BACK PAIN: ICD-10-CM

## 2025-03-31 DIAGNOSIS — M54.2 NECK PAIN: ICD-10-CM

## 2025-03-31 PROCEDURE — 97110 THERAPEUTIC EXERCISES: CPT | Mod: GP | Performed by: PHYSICAL THERAPIST

## 2025-03-31 NOTE — PROGRESS NOTES
"Physical Therapy Treatment    Patient Name: Merle Joshua  MRN: [id number]  Today's Date: 3/31/2025  Time Calculation  Start Time: 0805  Stop Time: 0843  Time Calculation (min): 38 min  PT Therapeutic Procedures Time Entry  Therapeutic Exercise Time Entry: 38      Insurance:  Visit number: 10 of 16  Authorization info: AUTH AFTER 30 VISITS / 100% COVERAGE / 30V   Insurance Type: Payor: [employer] MEDICAID / Plan: [employer] MEDICAID / Product Type: *No Product type* /   Insurance Type: Payor: [employer] MEDICAID / Plan: [employer] MEDICAID / Product Type: *No Product type* /     Current Problem   1. Neck pain  Follow Up In Physical Therapy      2. Chronic bilateral thoracic back pain  Follow Up In Physical Therapy          Subjective   General    Pt reports her L arm is sore type feeling for unknown reason, compliant with Hep and feels they are helping with neck/upper back discomfort.   Precautions:   None   Pain    0  Post Treatment Pain Level 0    Objective     Treatments:  Therapeutic Exercise:   UEB 3/3 fwd/bwd   Supine thoracic stretch 10\"x4 B   Supine shoulder flexion #3 10x3 B  Supine shoulder chest press #3 10x3 B  Quad cat/cow 10x3   Austyn pose 30\"x3   Thread the needle 5x3  Seated HS stretch 30\"x3 B     DNP   Supine on half bolster:   - diaphragm breathing with arms at side 3'   - PPT 10x3    - B UE raise 10x2    - alt UE raise 10x2 B   - snow eleazar 10x2   - horizontal abd 10x2       Assessment   Assessment:    Pt tolerated session well focus on gentle strengthening and overall mobility and flexibility this date with good tolerance. Updated HEP this date with above exercises.      Plan:    Overall mobility next session including LE     OP EDUCATION:   Access Code [email or web address]     Goals:   Active       PT Problem       Pt will be 50% IND with HEP in 4 weeks in order to progress with therapy.        Start:  12/24/24    Expected End:  01/28/25            Pt will reduce " pain levels to no more than 3/10 in 4 weeks in order to improve sleep and self care tasks.        Start:  12/24/24    Expected End:  01/28/25            Pt will demonstrate full functional cervical ROM in 4 weeks for self care tasks.       Start:  12/24/24    Expected End:  01/28/25            Pt will demonstrate subjective improvement of ADLs and recreational activities through improved score of 10 on NDI in 4 weeks.        Start:  12/24/24    Expected End:  01/28/25               PT Problem       Pt will be 100% IND with HEP in 8 weeks in order to maintain progress with therapy.         Start:  12/24/24    Expected End:  02/27/25            Pt will reduce pain levels to no more than 1/10 in 8 weeks in order to improve sleep, cooking/cleaning and self care tasks.        Start:  12/24/24    Expected End:  02/27/25            Pt will improve B scapular/GH strength to 5/5 in 8 weeks in order to improve strength required to lift objects at home including groceries and to improve cleaning tasks.         Start:  12/24/24    Expected End:  02/27/25            Pt will demonstrate subjective improvement of ADLs and recreational activities through improved score of 0 on NDI in 8 weeks.        Start:  12/24/24    Expected End:  02/27/25

## 2025-04-07 ENCOUNTER — TELEPHONE (OUTPATIENT)
Dept: PHYSICAL THERAPY | Facility: CLINIC | Age: 60
End: 2025-04-07

## 2025-04-07 ENCOUNTER — TREATMENT (OUTPATIENT)
Dept: PHYSICAL THERAPY | Facility: CLINIC | Age: 60
End: 2025-04-07
Payer: MEDICAID

## 2025-04-07 DIAGNOSIS — M54.6 CHRONIC BILATERAL THORACIC BACK PAIN: ICD-10-CM

## 2025-04-07 DIAGNOSIS — G89.29 CHRONIC BILATERAL THORACIC BACK PAIN: ICD-10-CM

## 2025-04-07 DIAGNOSIS — M54.2 NECK PAIN: ICD-10-CM

## 2025-04-07 PROCEDURE — 97110 THERAPEUTIC EXERCISES: CPT | Mod: GP | Performed by: PHYSICAL THERAPIST

## 2025-04-07 NOTE — PROGRESS NOTES
"Physical Therapy Treatment    Patient Name: Merle Joshua  MRN: [id number]  Today's Date: 4/7/2025  Time Calculation  Start Time: 0750  Stop Time: 0820  Time Calculation (min): 30 min  PT Therapeutic Procedures Time Entry  Therapeutic Exercise Time Entry: 30      Insurance:  Visit number: 11 of 16  Authorization info: AUTH AFTER 30 VISITS / 100% COVERAGE / 30V   Insurance Type: Payor: [employer] MEDICAID / Plan: [employer] MEDICAID / Product Type: *No Product type* /   Insurance Type: Payor: [employer] MEDICAID / Plan: [employer] MEDICAID / Product Type: *No Product type* /     Current Problem   1. Neck pain  Follow Up In Physical Therapy      2. Chronic bilateral thoracic back pain  Follow Up In Physical Therapy          Subjective   General    Pt reports her L HS is sore unsure if it occurred when she was walking down the stairs or not. Otherwise no new complaints.   Precautions:   None   Pain    0  Post Treatment Pain Level 0    Objective     Treatments:  Therapeutic Exercise:   NuStep L5 5'   Seated HS stretch 30\"x3   Lateral steps red loop @ knees 3 laps   Squats with red loop @ knees 10x3   SA to LA row lime green TB 10x3   Standing horizontal abd lime green TB 10x3   Standing W lime green TB 10x3       DNP   Supine thoracic stretch 10\"x4 B   Supine shoulder flexion #3 10x3 B  Supine shoulder chest press #3 10x3 B  Quad cat/cow 10x3   Austyn pose 30\"x3   Thread the needle 5x3  Seated HS stretch 30\"x3 B   Supine on half bolster:   - diaphragm breathing with arms at side 3'   - PPT 10x3    - B UE raise 10x2    - alt UE raise 10x2 B   - snow eleazar 10x2   - horizontal abd 10x2       Assessment   Assessment:    Pt tolerated session well focus on combination of lower extremity and upper extremity strengthening for overall trunk and core control, will continue with current POC as tolerated.     Plan:    Overall mobility next session including LE     OP EDUCATION:   Access Code " [email or web address]     Goals:   Active       PT Problem       Pt will be 50% IND with HEP in 4 weeks in order to progress with therapy.        Start:  12/24/24    Expected End:  01/28/25            Pt will reduce pain levels to no more than 3/10 in 4 weeks in order to improve sleep and self care tasks.        Start:  12/24/24    Expected End:  01/28/25            Pt will demonstrate full functional cervical ROM in 4 weeks for self care tasks.       Start:  12/24/24    Expected End:  01/28/25            Pt will demonstrate subjective improvement of ADLs and recreational activities through improved score of 10 on NDI in 4 weeks.        Start:  12/24/24    Expected End:  01/28/25               PT Problem       Pt will be 100% IND with HEP in 8 weeks in order to maintain progress with therapy.         Start:  12/24/24    Expected End:  02/27/25            Pt will reduce pain levels to no more than 1/10 in 8 weeks in order to improve sleep, cooking/cleaning and self care tasks.        Start:  12/24/24    Expected End:  02/27/25            Pt will improve B scapular/GH strength to 5/5 in 8 weeks in order to improve strength required to lift objects at home including groceries and to improve cleaning tasks.         Start:  12/24/24    Expected End:  02/27/25            Pt will demonstrate subjective improvement of ADLs and recreational activities through improved score of 0 on NDI in 8 weeks.        Start:  12/24/24    Expected End:  02/27/25

## 2025-04-07 NOTE — TELEPHONE ENCOUNTER
Patient called and asked us to fax over proof she was seen here today to [facility] [phone] 04/07/25

## 2025-04-09 PROBLEM — F51.01 PRIMARY INSOMNIA: Status: ACTIVE | Noted: 2025-04-09

## 2025-04-09 PROBLEM — M54.50 CHRONIC BILATERAL LOW BACK PAIN WITHOUT SCIATICA: Status: ACTIVE | Noted: 2025-04-09

## 2025-04-09 PROBLEM — G89.29 CHRONIC BILATERAL LOW BACK PAIN WITHOUT SCIATICA: Status: ACTIVE | Noted: 2025-04-09
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
## 2025-04-14 ENCOUNTER — TREATMENT (OUTPATIENT)
Dept: PHYSICAL THERAPY | Facility: CLINIC | Age: 60
End: 2025-04-14
Payer: MEDICAID

## 2025-04-14 DIAGNOSIS — G89.29 CHRONIC BILATERAL THORACIC BACK PAIN: ICD-10-CM

## 2025-04-14 DIAGNOSIS — M54.6 CHRONIC BILATERAL THORACIC BACK PAIN: ICD-10-CM

## 2025-04-14 DIAGNOSIS — M54.2 NECK PAIN: ICD-10-CM

## 2025-04-14 PROCEDURE — 97110 THERAPEUTIC EXERCISES: CPT | Mod: GP | Performed by: PHYSICAL THERAPIST

## 2025-04-14 NOTE — PROGRESS NOTES
"Physical Therapy Treatment    Patient Name: Merle Joshua  MRN: [id number]  Today's Date: 4/14/2025  Time Calculation  Start Time: 0803  Stop Time: 0826  Time Calculation (min): 23 min  PT Therapeutic Procedures Time Entry  Therapeutic Exercise Time Entry: 23      Insurance:  Visit number: 12 of 16  Authorization info: AUTH AFTER 30 VISITS / 100% COVERAGE / 30V   Insurance Type: Payor: [employer] MEDICAID / Plan: [employer] MEDICAID / Product Type: *No Product type* /   Insurance Type: Payor: [employer] MEDICAID / Plan: [employer] MEDICAID / Product Type: *No Product type* /     Current Problem   1. Neck pain  Follow Up In Physical Therapy      2. Chronic bilateral thoracic back pain  Follow Up In Physical Therapy          Subjective   General    Pt reports she has not had any neck discomfort and her knee is feeling better. She feels that PT has been very helpful thus far.   Precautions:   None   Pain    0  Post Treatment Pain Level 0    Objective   Trunk compensation during ER     Treatments:  Therapeutic Exercise:   UEB 3/3 fwd/bwd   SA to LA row baby blue TB 10x3   Standing ER baby blue TB 10x3 B  Standing B GH flexion to 90 #3 10x3   Standing B GH scaption to 90 #3 10x3     DNP   NuStep L5 5'   Seated HS stretch 30\"x3   Lateral steps red loop @ knees 3 laps   Squats with red loop @ knees 10x3   Standing horizontal abd lime green TB 10x3   Standing W lime green TB 10x3   Supine thoracic stretch 10\"x4 B   Supine shoulder flexion #3 10x3 B  Supine shoulder chest press #3 10x3 B  Quad cat/cow 10x3   Austyn pose 30\"x3   Thread the needle 5x3  Seated HS stretch 30\"x3 B   Supine on half bolster:   - diaphragm breathing with arms at side 3'   - PPT 10x3    - B UE raise 10x2    - alt UE raise 10x2 B   - snow eleazar 10x2   - horizontal abd 10x2       Assessment   Assessment:    Pt tolerated session well challenged with weight training this date to overall fatigue and weakness. Will " continue as tolerated.     Plan:    Continue overall strength and mobility training - weight training.     OP EDUCATION:   Access Code [email or web address]     Goals:   Active       PT Problem       Pt will be 50% IND with HEP in 4 weeks in order to progress with therapy.        Start:  12/24/24    Expected End:  01/28/25            Pt will reduce pain levels to no more than 3/10 in 4 weeks in order to improve sleep and self care tasks.        Start:  12/24/24    Expected End:  01/28/25            Pt will demonstrate full functional cervical ROM in 4 weeks for self care tasks.       Start:  12/24/24    Expected End:  01/28/25            Pt will demonstrate subjective improvement of ADLs and recreational activities through improved score of 10 on NDI in 4 weeks.        Start:  12/24/24    Expected End:  01/28/25               PT Problem       Pt will be 100% IND with HEP in 8 weeks in order to maintain progress with therapy.         Start:  12/24/24    Expected End:  02/27/25            Pt will reduce pain levels to no more than 1/10 in 8 weeks in order to improve sleep, cooking/cleaning and self care tasks.        Start:  12/24/24    Expected End:  02/27/25            Pt will improve B scapular/GH strength to 5/5 in 8 weeks in order to improve strength required to lift objects at home including groceries and to improve cleaning tasks.         Start:  12/24/24    Expected End:  02/27/25            Pt will demonstrate subjective improvement of ADLs and recreational activities through improved score of 0 on NDI in 8 weeks.        Start:  12/24/24    Expected End:  02/27/25

## 2025-04-15 ENCOUNTER — OFFICE VISIT (OUTPATIENT)
Dept: PRIMARY CARE | Facility: CLINIC | Age: 60
End: 2025-04-15
Payer: MEDICAID

## 2025-04-15 VITALS
WEIGHT: 190.8 LBS | OXYGEN SATURATION: 98 % | BODY MASS INDEX: 29.88 KG/M2 | SYSTOLIC BLOOD PRESSURE: 110 MMHG | HEART RATE: 85 BPM | DIASTOLIC BLOOD PRESSURE: 80 MMHG

## 2025-04-15 DIAGNOSIS — R79.89 ELEVATED FERRITIN: ICD-10-CM

## 2025-04-15 DIAGNOSIS — F41.9 ANXIETY: ICD-10-CM

## 2025-04-15 DIAGNOSIS — G43.909 MIGRAINE WITHOUT STATUS MIGRAINOSUS, NOT INTRACTABLE, UNSPECIFIED MIGRAINE TYPE: ICD-10-CM
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
## 2025-04-15 DIAGNOSIS — K04.7 DENTAL INFECTION: ICD-10-CM

## 2025-04-15 DIAGNOSIS — M54.2 NECK PAIN: Primary | ICD-10-CM

## 2025-04-15 DIAGNOSIS — F51.01 PRIMARY INSOMNIA: ICD-10-CM

## 2025-04-15 DIAGNOSIS — G89.29 CHRONIC BILATERAL LOW BACK PAIN WITHOUT SCIATICA: ICD-10-CM

## 2025-04-15 DIAGNOSIS — D64.9 ANEMIA, UNSPECIFIED TYPE: ICD-10-CM

## 2025-04-15 DIAGNOSIS — K21.9 GASTROESOPHAGEAL REFLUX DISEASE WITHOUT ESOPHAGITIS: ICD-10-CM

## 2025-04-15 DIAGNOSIS — M54.50 CHRONIC BILATERAL LOW BACK PAIN WITHOUT SCIATICA: ICD-10-CM

## 2025-04-15 PROCEDURE — 1036F TOBACCO NON-USER: CPT | Performed by: PHYSICIAN ASSISTANT

## 2025-04-15 PROCEDURE — 99214 OFFICE O/P EST MOD 30 MIN: CPT | Performed by: PHYSICIAN ASSISTANT

## 2025-04-15 RX ORDER — AMOXICILLIN 875 MG/1
875 TABLET, FILM COATED ORAL 2 TIMES DAILY
Qty: 20 TABLET | Refills: 0 | Status: SHIPPED | OUTPATIENT
Start: 2025-04-15 | End: 2025-04-25

## 2025-04-15 RX ORDER — MELOXICAM 15 MG/1
15 TABLET ORAL DAILY
Qty: 30 TABLET | Refills: 1 | Status: SHIPPED | OUTPATIENT
Start: 2025-04-15 | End: 2025-06-14

## 2025-04-15 ASSESSMENT — PATIENT HEALTH QUESTIONNAIRE - PHQ9
1. LITTLE INTEREST OR PLEASURE IN DOING THINGS: NOT AT ALL
SUM OF ALL RESPONSES TO PHQ9 QUESTIONS 1 AND 2: 0
2. FEELING DOWN, DEPRESSED OR HOPELESS: NOT AT ALL

## 2025-04-15 ASSESSMENT — LIFESTYLE VARIABLES
AUDIT-C TOTAL SCORE: 0
SKIP TO QUESTIONS 9-10: 1
HOW OFTEN DO YOU HAVE A DRINK CONTAINING ALCOHOL: NEVER
HOW MANY STANDARD DRINKS CONTAINING ALCOHOL DO YOU HAVE ON A TYPICAL DAY: PATIENT DOES NOT DRINK
HOW OFTEN DO YOU HAVE SIX OR MORE DRINKS ON ONE OCCASION: NEVER

## 2025-04-15 ASSESSMENT — ENCOUNTER SYMPTOMS
LOSS OF SENSATION IN FEET: 0
DEPRESSION: 0
OCCASIONAL FEELINGS OF UNSTEADINESS: 0

## 2025-04-15 ASSESSMENT — PAIN SCALES - GENERAL: PAINLEVEL_OUTOF10: 6

## 2025-04-17 DIAGNOSIS — G43.909 MIGRAINE WITHOUT STATUS MIGRAINOSUS, NOT INTRACTABLE, UNSPECIFIED MIGRAINE TYPE: ICD-10-CM

## 2025-04-21 ENCOUNTER — TREATMENT (OUTPATIENT)
Dept: PHYSICAL THERAPY | Facility: CLINIC | Age: 60
End: 2025-04-21
Payer: MEDICAID

## 2025-04-21 DIAGNOSIS — M54.6 CHRONIC BILATERAL THORACIC BACK PAIN: ICD-10-CM

## 2025-04-21 DIAGNOSIS — M54.2 NECK PAIN: ICD-10-CM

## 2025-04-21 DIAGNOSIS — G89.29 CHRONIC BILATERAL THORACIC BACK PAIN: ICD-10-CM

## 2025-04-21 PROCEDURE — 97110 THERAPEUTIC EXERCISES: CPT | Mod: GP | Performed by: PHYSICAL THERAPIST

## 2025-04-21 NOTE — PROGRESS NOTES
"Physical Therapy Treatment    Patient Name: Merle Joshua  MRN: [id number]  Today's Date: 4/21/2025  Time Calculation  Start Time: 0750  Stop Time: 0829  Time Calculation (min): 39 min  PT Therapeutic Procedures Time Entry  Therapeutic Exercise Time Entry: 39      Insurance:  Visit number: 13 of 16  Authorization info: AUTH AFTER 30 VISITS / 100% COVERAGE / 30V   Insurance Type: Payor: [employer] MEDICAID / Plan: [employer] MEDICAID / Product Type: *No Product type* /   Insurance Type: Payor: [employer] MEDICAID / Plan: [employer] MEDICAID / Product Type: *No Product type* /     Current Problem   1. Neck pain  Follow Up In Physical Therapy      2. Chronic bilateral thoracic back pain  Follow Up In Physical Therapy          Subjective   General    Pt reports that therapy is working, even though progress has been slow, but she feels stronger and overall better. She does not report any headaches and her neck is better.   Precautions:   None   Pain    0  Post Treatment Pain Level 0    Objective   Trunk compensation during ER     Treatments:  Therapeutic Exercise:   UEB 3/3 fwd/bwd   Open book 30\"x3 B   Thread the needle 5x3  Quad cat/cow 10x3   Supine fly #5 10x3 B  Supine shoulder chest press#5 10x3 B  Standing bent over row and tricep kick back #5 10x3 B   SB roll out stretch 10x3 with lateral stretch    DNP   SA to LA row baby blue TB 10x3   Standing ER baby blue TB 10x3 B  Standing B GH flexion to 90 #3 10x3   Standing B GH scaption to 90 #3 10x3   NuStep L5 5'   Seated HS stretch 30\"x3   Lateral steps red loop @ knees 3 laps   Squats with red loop @ knees 10x3   Standing horizontal abd lime green TB 10x3   Standing W lime green TB 10x3   Supine thoracic stretch 10\"x4 B   Austyn pose 30\"x3   Seated HS stretch 30\"x3 B   Supine on half bolster:   - diaphragm breathing with arms at side 3'   - PPT 10x3    - B UE raise 10x2    - alt UE raise 10x2 B   - snow eleazar 10x2   - " horizontal abd 10x2       Assessment   Assessment:    Pt tolerated session well continued to focus on mobility and strengthening this date, good tolerance, continues to require demonstration to prevent trunk compensation. Will continue with above exercises as tolerated.     Plan:    Continue overall strength and mobility training - weight training.     OP EDUCATION:   Access Code [email or web address]     Goals:   Active       PT Problem       Pt will be 50% IND with HEP in 4 weeks in order to progress with therapy.        Start:  12/24/24    Expected End:  01/28/25            Pt will reduce pain levels to no more than 3/10 in 4 weeks in order to improve sleep and self care tasks.        Start:  12/24/24    Expected End:  01/28/25            Pt will demonstrate full functional cervical ROM in 4 weeks for self care tasks.       Start:  12/24/24    Expected End:  01/28/25            Pt will demonstrate subjective improvement of ADLs and recreational activities through improved score of 10 on NDI in 4 weeks.        Start:  12/24/24    Expected End:  01/28/25               PT Problem       Pt will be 100% IND with HEP in 8 weeks in order to maintain progress with therapy.         Start:  12/24/24    Expected End:  02/27/25            Pt will reduce pain levels to no more than 1/10 in 8 weeks in order to improve sleep, cooking/cleaning and self care tasks.        Start:  12/24/24    Expected End:  02/27/25            Pt will improve B scapular/GH strength to 5/5 in 8 weeks in order to improve strength required to lift objects at home including groceries and to improve cleaning tasks.         Start:  12/24/24    Expected End:  02/27/25            Pt will demonstrate subjective improvement of ADLs and recreational activities through improved score of 0 on NDI in 8 weeks.        Start:  12/24/24    Expected End:  02/27/25

## 2025-04-22 DIAGNOSIS — G43.909 MIGRAINE WITHOUT STATUS MIGRAINOSUS, NOT INTRACTABLE, UNSPECIFIED MIGRAINE TYPE: ICD-10-CM

## 2025-04-28 ENCOUNTER — TREATMENT (OUTPATIENT)
Dept: PHYSICAL THERAPY | Facility: CLINIC | Age: 60
End: 2025-04-28
Payer: MEDICAID

## 2025-04-28 DIAGNOSIS — M54.6 CHRONIC BILATERAL THORACIC BACK PAIN: ICD-10-CM

## 2025-04-28 DIAGNOSIS — G89.29 CHRONIC BILATERAL THORACIC BACK PAIN: ICD-10-CM

## 2025-04-28 DIAGNOSIS — M54.2 NECK PAIN: ICD-10-CM

## 2025-04-28 PROCEDURE — 97140 MANUAL THERAPY 1/> REGIONS: CPT | Mod: GP | Performed by: PHYSICAL THERAPIST

## 2025-04-28 NOTE — PROGRESS NOTES
Physical Therapy Treatment    Patient Name: Merle Joshua  MRN: [id number]  Today's Date: 4/28/2025  Time Calculation  Start Time: 0933  Stop Time: 1015  Time Calculation (min): 42 min  PT Therapeutic Procedures Time Entry  Manual Therapy Time Entry: 35      Insurance:  Visit number: 14 of 16  Authorization info: AUTH AFTER 30 VISITS / 100% COVERAGE / 30V   Insurance Type: Payor: [employer] MEDICAID / Plan: [employer] MEDICAID / Product Type: *No Product type* /   Insurance Type: Payor: [employer] MEDICAID / Plan: [employer] MEDICAID / Product Type: *No Product type* /     Current Problem   1. Neck pain  Follow Up In Physical Therapy      2. Chronic bilateral thoracic back pain  Follow Up In Physical Therapy          Subjective   General    Pt reports she was sore after last session but it was a good soreness, she has some L upper trap discomfort this AM.   Precautions:   None   Pain    0  Post Treatment Pain Level 0    Objective   Tension along anterior upper trap, scalenes and SCM of L side     Treatments:  Manual therapy:  STM along upper trap, scalenes and SCM     Assessment   Assessment:    Pt tolerated session well focus on manual therapy to help reduce muscle tension this date as pt has been progressing well with weight training.     Plan:    Continue overall strength and mobility training - weight training. SCM stretching     OP EDUCATION:   Access Code [email or web address]     Goals:   Active       PT Problem       Pt will be 50% IND with HEP in 4 weeks in order to progress with therapy.        Start:  12/24/24    Expected End:  01/28/25            Pt will reduce pain levels to no more than 3/10 in 4 weeks in order to improve sleep and self care tasks.        Start:  12/24/24    Expected End:  01/28/25            Pt will demonstrate full functional cervical ROM in 4 weeks for self care tasks.       Start:  12/24/24    Expected End:  01/28/25            Pt will demonstrate  subjective improvement of ADLs and recreational activities through improved score of 10 on NDI in 4 weeks.        Start:  12/24/24    Expected End:  01/28/25               PT Problem       Pt will be 100% IND with HEP in 8 weeks in order to maintain progress with therapy.         Start:  12/24/24    Expected End:  02/27/25            Pt will reduce pain levels to no more than 1/10 in 8 weeks in order to improve sleep, cooking/cleaning and self care tasks.        Start:  12/24/24    Expected End:  02/27/25            Pt will improve B scapular/GH strength to 5/5 in 8 weeks in order to improve strength required to lift objects at home including groceries and to improve cleaning tasks.         Start:  12/24/24    Expected End:  02/27/25            Pt will demonstrate subjective improvement of ADLs and recreational activities through improved score of 0 on NDI in 8 weeks.        Start:  12/24/24    Expected End:  02/27/25

## 2025-05-05 ENCOUNTER — APPOINTMENT (OUTPATIENT)
Dept: PHYSICAL THERAPY | Facility: CLINIC | Age: 60
End: 2025-05-05
Payer: MEDICAID

## 2025-05-05 ENCOUNTER — DOCUMENTATION (OUTPATIENT)
Dept: PHYSICAL THERAPY | Facility: CLINIC | Age: 60
End: 2025-05-05
Payer: MEDICAID

## 2025-05-05 NOTE — PROGRESS NOTES
Physical Therapy                 Therapy Communication Note    Patient Name: Merle Joshua  MRN: [id number]  Department:   Room: Room/bed info not found  Today's Date: 5/5/2025     Discipline: Physical Therapy          Missed Visit Reason:  Sick    Missed Time: Cancel    Comment:

## 2025-05-06 ENCOUNTER — OFFICE VISIT (OUTPATIENT)
Dept: URGENT CARE | Age: 60
End: 2025-05-06
Payer: MEDICAID

## 2025-05-06 VITALS
BODY MASS INDEX: 28.25 KG/M2 | DIASTOLIC BLOOD PRESSURE: 88 MMHG | HEIGHT: 67 IN | RESPIRATION RATE: 16 BRPM | HEART RATE: 89 BPM | OXYGEN SATURATION: 95 % | TEMPERATURE: 98.7 F | WEIGHT: 180 LBS | SYSTOLIC BLOOD PRESSURE: 129 MMHG

## 2025-05-06 DIAGNOSIS — Z11.3 ENCOUNTER FOR SCREENING FOR INFECTIONS WITH A PREDOMINANTLY SEXUAL MODE OF TRANSMISSION: Primary | ICD-10-CM

## 2025-05-06 DIAGNOSIS — R35.0 INCREASED FREQUENCY OF URINATION: ICD-10-CM

## 2025-05-06 LAB
POC APPEARANCE, URINE: CLEAR
POC BILIRUBIN, URINE: NEGATIVE
POC BLOOD, URINE: NEGATIVE
POC COLOR, URINE: YELLOW
POC GLUCOSE, URINE: NEGATIVE MG/DL
POC KETONES, URINE: NEGATIVE MG/DL
POC LEUKOCYTES, URINE: NEGATIVE
POC NITRITE,URINE: NEGATIVE
POC PH, URINE: 6 PH
POC PROTEIN, URINE: NEGATIVE MG/DL
POC SPECIFIC GRAVITY, URINE: 1.01
POC UROBILINOGEN, URINE: 0.2 EU/DL
PREGNANCY TEST URINE, POC: NEGATIVE

## 2025-05-06 PROCEDURE — 99203 OFFICE O/P NEW LOW 30 MIN: CPT | Performed by: EMERGENCY MEDICINE

## 2025-05-06 PROCEDURE — 3008F BODY MASS INDEX DOCD: CPT | Performed by: EMERGENCY MEDICINE

## 2025-05-06 PROCEDURE — 81003 URINALYSIS AUTO W/O SCOPE: CPT | Performed by: EMERGENCY MEDICINE

## 2025-05-06 PROCEDURE — 81025 URINE PREGNANCY TEST: CPT | Performed by: EMERGENCY MEDICINE

## 2025-05-06 PROCEDURE — 1036F TOBACCO NON-USER: CPT | Performed by: EMERGENCY MEDICINE

## 2025-05-06 ASSESSMENT — ENCOUNTER SYMPTOMS
FATIGUE: 0
APPETITE CHANGE: 0
CONSTIPATION: 0
SORE THROAT: 0
BACK PAIN: 0
ARTHRALGIAS: 0
NAUSEA: 0
EYE PAIN: 0
NECK PAIN: 0
FLANK PAIN: 0
DIARRHEA: 0
HEADACHES: 0
DYSURIA: 0
TROUBLE SWALLOWING: 0
RHINORRHEA: 0
SINUS PAIN: 0
SINUS PRESSURE: 0
VOMITING: 0
WOUND: 0
DIZZINESS: 0
FEVER: 0
CHEST TIGHTNESS: 0
FREQUENCY: 1
HEMATURIA: 0
SHORTNESS OF BREATH: 0
PALPITATIONS: 0
DIFFICULTY URINATING: 0
MYALGIAS: 0
CHILLS: 0
ABDOMINAL PAIN: 0
COUGH: 0

## 2025-05-06 ASSESSMENT — VISUAL ACUITY: OU: 1

## 2025-05-06 ASSESSMENT — PAIN SCALES - GENERAL: PAINLEVEL_OUTOF10: 0-NO PAIN

## 2025-05-06 NOTE — PROGRESS NOTES
Subjective   Patient ID: Merle Joshua is a 59 y.o. female. They present today with a chief complaint of Urinary Problem (Frequency urination. /STI testing, ).    History of Present Illness  Patient is a 60yo female who presents with urinary frequency and STI testing. Patient has a new partner and has noticed an increase in urinary frequency. Denies discharge and pain.             Past Medical History  Allergies as of 05/06/2025 - Reviewed 05/06/2025   Allergen Reaction Noted    Adhesive Hives 11/20/2024       Prescriptions Prior to Admission[1]     Medical History[2]    Surgical History[3]     reports that she has never smoked. She has never been exposed to tobacco smoke. She has never used smokeless tobacco. She reports that she does not drink alcohol and does not use drugs.    Review of Systems  Review of Systems   Constitutional:  Negative for appetite change, chills, fatigue and fever.   HENT:  Negative for congestion, dental problem, ear pain, hearing loss, postnasal drip, rhinorrhea, sinus pressure, sinus pain, sneezing, sore throat and trouble swallowing.    Eyes:  Negative for pain.   Respiratory:  Negative for cough, chest tightness and shortness of breath.    Cardiovascular:  Negative for chest pain and palpitations.   Gastrointestinal:  Negative for abdominal pain, constipation, diarrhea, nausea and vomiting.   Genitourinary:  Positive for frequency and urgency. Negative for decreased urine volume, difficulty urinating, dysuria, enuresis, flank pain, genital sores, hematuria, menstrual problem, pelvic pain, vaginal bleeding, vaginal discharge and vaginal pain.   Musculoskeletal:  Negative for arthralgias, back pain, gait problem, myalgias and neck pain.   Skin:  Negative for rash and wound.   Neurological:  Negative for dizziness and headaches.   Psychiatric/Behavioral:  Negative for self-injury and suicidal ideas.                                   Objective    Vitals:    05/06/25 1641   BP: 129/88  "  Pulse: 89   Resp: 16   Temp: 37.1 °C (98.7 °F)   SpO2: 95%   Weight: 81.6 kg (180 lb)   Height: 1.702 m (5' 7\")     No LMP recorded (lmp unknown). Patient is postmenopausal.    Physical Exam  Vitals reviewed.   Constitutional:       General: She is awake. She is not in acute distress.     Appearance: Normal appearance. She is well-developed, well-groomed and normal weight. She is not ill-appearing.   HENT:      Head: Normocephalic and atraumatic.      Right Ear: Hearing and external ear normal.      Left Ear: Hearing and external ear normal.      Nose: Nose normal. No nasal deformity or signs of injury.      Mouth/Throat:      Lips: Pink.   Eyes:      General: Lids are normal. Vision grossly intact.   Cardiovascular:      Rate and Rhythm: Normal rate and regular rhythm.      Heart sounds: Normal heart sounds, S1 normal and S2 normal. Heart sounds not distant. No murmur heard.  Pulmonary:      Effort: Pulmonary effort is normal. No tachypnea, bradypnea, accessory muscle usage, prolonged expiration, respiratory distress or retractions.      Breath sounds: Normal breath sounds and air entry. No stridor, decreased air movement or transmitted upper airway sounds. No decreased breath sounds.   Chest:      Chest wall: No deformity.   Abdominal:      General: Abdomen is flat. Bowel sounds are normal.      Palpations: Abdomen is soft.      Tenderness: There is no right CVA tenderness or left CVA tenderness.   Skin:     General: Skin is warm and dry.      Capillary Refill: Capillary refill takes less than 2 seconds.   Neurological:      General: No focal deficit present.      Mental Status: She is alert.      Gait: Gait is intact.   Psychiatric:         Attention and Perception: Attention and perception normal.         Mood and Affect: Mood and affect normal.         Speech: Speech normal.         Behavior: Behavior normal. Behavior is cooperative.         Procedures    Point of Care Test & Imaging Results from this " visit  Results for orders placed or performed in visit on 05/06/25   POCT UA Automated manually resulted   Result Value Ref Range    POC Color, Urine Yellow Straw, Yellow, Light-Yellow    POC Appearance, Urine Clear Clear    POC Glucose, Urine NEGATIVE NEGATIVE mg/dl    POC Bilirubin, Urine NEGATIVE NEGATIVE    POC Ketones, Urine NEGATIVE NEGATIVE mg/dl    POC Specific Gravity, Urine 1.010 1.005 - 1.035    POC Blood, Urine NEGATIVE NEGATIVE    POC PH, Urine 6.0 No Reference Range Established PH    POC Protein, Urine NEGATIVE NEGATIVE mg/dl    POC Urobilinogen, Urine 0.2 0.2, 1.0 EU/DL    Poc Nitrite, Urine NEGATIVE NEGATIVE    POC Leukocytes, Urine NEGATIVE NEGATIVE   POCT pregnancy, urine manually resulted   Result Value Ref Range    Preg Test, Ur Negative Negative      Imaging  No results found.    Cardiology, Vascular, and Other Imaging  No other imaging results found for the past 2 days      Diagnostic study results (if any) were reviewed by RODRÍGUEZ Myers.    Assessment/Plan   Allergies, medications, history, and pertinent labs/EKGs/Imaging reviewed by RODRÍGUEZ Myers.     Medical Decision Making  Urinalysis was WNL     Orders and Diagnoses  Diagnoses and all orders for this visit:  Encounter for screening for infections with a predominantly sexual mode of transmission  -     Urine Culture  -     Vaginitis Gram Stain For Bacterial Vaginosis + Yeast  -     C. trachomatis / N. gonorrhoeae, Amplified, Urogenital  -     Trichomonas vaginalis, Amplified  Increased frequency of urination  -     POCT UA Automated manually resulted  -     POCT pregnancy, urine manually resulted      Medical Admin Record      Patient disposition: Home    Electronically signed by RODRÍGUEZ Myers  5:15 PM           [1] (Not in a hospital admission)   [2] No past medical history on file.  [3] No past surgical history on file.

## 2025-05-07 LAB
BV SCORE VAG QL: NORMAL
C TRACH RRNA SPEC QL NAA+PROBE: NOT DETECTED
N GONORRHOEA RRNA SPEC QL NAA+PROBE: NOT DETECTED
QUEST GC CT AMPLIFIED (ALWAYS MESSAGE): NORMAL
T VAGINALIS RRNA SPEC QL NAA+PROBE: NOT DETECTED

## 2025-05-08 LAB — BACTERIA UR CULT: ABNORMAL

## 2025-05-12 ENCOUNTER — TREATMENT (OUTPATIENT)
Dept: PHYSICAL THERAPY | Facility: CLINIC | Age: 60
End: 2025-05-12
Payer: MEDICAID

## 2025-05-12 DIAGNOSIS — M54.6 CHRONIC BILATERAL THORACIC BACK PAIN: ICD-10-CM

## 2025-05-12 DIAGNOSIS — M54.2 NECK PAIN: ICD-10-CM

## 2025-05-12 DIAGNOSIS — G89.29 CHRONIC BILATERAL THORACIC BACK PAIN: ICD-10-CM

## 2025-05-12 PROCEDURE — 97110 THERAPEUTIC EXERCISES: CPT | Mod: GP | Performed by: PHYSICAL THERAPIST

## 2025-05-12 NOTE — PROGRESS NOTES
Physical Therapy Treatment    Patient Name: Merle Joshua  MRN: [id number]  Today's Date: 5/12/2025  Time Calculation  Start Time: 0832  Stop Time: 0914  Time Calculation (min): 42 min  PT Therapeutic Procedures Time Entry  Therapeutic Exercise Time Entry: 42      Insurance:  Visit number: 15 of 16  Authorization info: AUTH AFTER 30 VISITS / 100% COVERAGE / 30V   Insurance Type: Payor: [employer] MEDICAID / Plan: [employer] MEDICAID / Product Type: *No Product type* /   Insurance Type: Payor: [employer] MEDICAID / Plan: [employer] MEDICAID / Product Type: *No Product type* /     Current Problem   1. Neck pain  Follow Up In Physical Therapy      2. Chronic bilateral thoracic back pain  Follow Up In Physical Therapy          Subjective   General    Pt reports she is doing well, feels that her body has a lot of inflammation which creates intermittent symptoms.   Precautions:   None   Pain    0  Post Treatment Pain Level 0    Objective   Significant upper trap activation     Treatments:  Therapeutic exercise:  Recumbent bike/UEB 5' warm up  Palloff press lime TB 10x3   Eccentric standing Y lime TB 10x3   Rows #50 10x3   PNF D2 UE #35 10x3 B   Squat tap with #12 weight hold 10x2  Seated SB roll out stretch 10x    Assessment   Assessment:    Pt tolerated session well focus on overall strengthening for postural control and muscle contraction as well as activation of core endurance to lumbar stability. Pt required mod VC throughout to prevent upper trap compensation/over activation.     Plan:    Continue overall strength and mobility training - weight training.      OP EDUCATION:   Access Code [email or web address]     Goals:   Active       PT Problem       Pt will be 50% IND with HEP in 4 weeks in order to progress with therapy.        Start:  12/24/24    Expected End:  01/28/25            Pt will reduce pain levels to no more than 3/10 in 4 weeks in order to improve sleep and self care tasks.         Start:  12/24/24    Expected End:  01/28/25            Pt will demonstrate full functional cervical ROM in 4 weeks for self care tasks.       Start:  12/24/24    Expected End:  01/28/25            Pt will demonstrate subjective improvement of ADLs and recreational activities through improved score of 10 on NDI in 4 weeks.        Start:  12/24/24    Expected End:  01/28/25               PT Problem       Pt will be 100% IND with HEP in 8 weeks in order to maintain progress with therapy.         Start:  12/24/24    Expected End:  02/27/25            Pt will reduce pain levels to no more than 1/10 in 8 weeks in order to improve sleep, cooking/cleaning and self care tasks.        Start:  12/24/24    Expected End:  02/27/25            Pt will improve B scapular/GH strength to 5/5 in 8 weeks in order to improve strength required to lift objects at home including groceries and to improve cleaning tasks.         Start:  12/24/24    Expected End:  02/27/25            Pt will demonstrate subjective improvement of ADLs and recreational activities through improved score of 0 on NDI in 8 weeks.        Start:  12/24/24    Expected End:  02/27/25

## 2025-05-15 DIAGNOSIS — R39.9 UTI SYMPTOMS: ICD-10-CM

## 2025-05-15 LAB
POC APPEARANCE, URINE: CLEAR
POC BILIRUBIN, URINE: NEGATIVE
POC BLOOD, URINE: NEGATIVE
POC COLOR, URINE: YELLOW
POC GLUCOSE, URINE: NEGATIVE MG/DL
POC KETONES, URINE: NEGATIVE MG/DL
POC LEUKOCYTES, URINE: NEGATIVE
POC NITRITE,URINE: NEGATIVE
POC PH, URINE: 6 PH
POC PROTEIN, URINE: NEGATIVE MG/DL
POC SPECIFIC GRAVITY, URINE: 1.01
POC UROBILINOGEN, URINE: 0.2 EU/DL

## 2025-05-30 ENCOUNTER — TREATMENT (OUTPATIENT)
Dept: PHYSICAL THERAPY | Facility: CLINIC | Age: 60
End: 2025-05-30
Payer: MEDICAID

## 2025-05-30 DIAGNOSIS — M54.2 NECK PAIN: ICD-10-CM

## 2025-05-30 DIAGNOSIS — M54.6 CHRONIC BILATERAL THORACIC BACK PAIN: ICD-10-CM

## 2025-05-30 DIAGNOSIS — G89.29 CHRONIC BILATERAL THORACIC BACK PAIN: ICD-10-CM

## 2025-05-30 PROCEDURE — 97110 THERAPEUTIC EXERCISES: CPT | Mod: GP | Performed by: PHYSICAL THERAPIST

## 2025-05-30 NOTE — PROGRESS NOTES
Physical Therapy Treatment    Patient Name: Merle Joshua  MRN: [id number]  Today's Date: 5/30/2025  Time Calculation  Start Time: 1018  Stop Time: 1050  Time Calculation (min): 32 min  PT Therapeutic Procedures Time Entry  Therapeutic Exercise Time Entry: 32      Insurance:  Visit number: 16 of 20  Authorization info: AUTH AFTER 30 VISITS / 100% COVERAGE / 30V   Insurance Type: Payor: [employer] MEDICAID / Plan: [employer] MEDICAID / Product Type: *No Product type* /   Insurance Type: Payor: [employer] MEDICAID / Plan: [employer] MEDICAID / Product Type: *No Product type* /     Current Problem   1. Neck pain  Follow Up In Physical Therapy      2. Chronic bilateral thoracic back pain  Follow Up In Physical Therapy          Subjective   General    Pt reports she is good, minimal pain throughout her cervical and thoracic regions at this time, very pleased with therapy process and compliant with HEP. Did not increased knee discomfort which may be due to new exercise routine.   Precautions:   None   Pain    0  Post Treatment Pain Level 0    Objective   Initial difficulty with PPT in standing position and compensation of truck, VC and demonstrative to correct     Treatments:  Therapeutic exercise:  UEB 5' warm up  Rows #65 10x3   Postrual control with PPT at wall 10x3   Postural control with shoulder contacting wall and PPT 10x3   Standing wall arm slides with back against the wall 10x3  - + focus on PPT  Seated SB roll out stretch 10x      Assessment   Assessment:    Pt tolerated session well focus on postural control at wall with good control but noted fatigue, will continue as tolerated.     Plan:    Continue overall strength and mobility training - weight training.      OP EDUCATION:   Access Code [email or web address]     Goals:   Active       PT Problem       Pt will be 50% IND with HEP in 4 weeks in order to progress with therapy.        Start:  12/24/24    Expected End:  01/28/25             Pt will reduce pain levels to no more than 3/10 in 4 weeks in order to improve sleep and self care tasks.        Start:  12/24/24    Expected End:  01/28/25            Pt will demonstrate full functional cervical ROM in 4 weeks for self care tasks.       Start:  12/24/24    Expected End:  01/28/25            Pt will demonstrate subjective improvement of ADLs and recreational activities through improved score of 10 on NDI in 4 weeks.        Start:  12/24/24    Expected End:  01/28/25               PT Problem       Pt will be 100% IND with HEP in 8 weeks in order to maintain progress with therapy.         Start:  12/24/24    Expected End:  02/27/25            Pt will reduce pain levels to no more than 1/10 in 8 weeks in order to improve sleep, cooking/cleaning and self care tasks.        Start:  12/24/24    Expected End:  02/27/25            Pt will improve B scapular/GH strength to 5/5 in 8 weeks in order to improve strength required to lift objects at home including groceries and to improve cleaning tasks.         Start:  12/24/24    Expected End:  02/27/25            Pt will demonstrate subjective improvement of ADLs and recreational activities through improved score of 0 on NDI in 8 weeks.        Start:  12/24/24    Expected End:  02/27/25

## 2025-06-13 ENCOUNTER — TREATMENT (OUTPATIENT)
Dept: PHYSICAL THERAPY | Facility: CLINIC | Age: 60
End: 2025-06-13
Payer: MEDICAID

## 2025-06-13 DIAGNOSIS — M54.2 NECK PAIN: ICD-10-CM

## 2025-06-13 DIAGNOSIS — G89.29 CHRONIC BILATERAL THORACIC BACK PAIN: ICD-10-CM

## 2025-06-13 DIAGNOSIS — M54.6 CHRONIC BILATERAL THORACIC BACK PAIN: ICD-10-CM

## 2025-06-13 PROCEDURE — 97110 THERAPEUTIC EXERCISES: CPT | Mod: GP | Performed by: PHYSICAL THERAPIST

## 2025-06-13 NOTE — PROGRESS NOTES
Physical Therapy Treatment    Patient Name: Merle Joshua  MRN: [id number]  Today's Date: 6/13/2025  Time Calculation  Start Time: 0803  Stop Time: 0835  Time Calculation (min): 32 min  PT Therapeutic Procedures Time Entry  Therapeutic Exercise Time Entry: 32      Insurance:  Visit number: 17 of 20  Authorization info: AUTH AFTER 30 VISITS / 100% COVERAGE / 30V   Insurance Type: Payor: [employer] MEDICAID / Plan: [employer] MEDICAID / Product Type: *No Product type* /   Insurance Type: Payor: [employer] MEDICAID / Plan: [employer] MEDICAID / Product Type: *No Product type* /     Current Problem   1. Neck pain  Follow Up In Physical Therapy      2. Chronic bilateral thoracic back pain  Follow Up In Physical Therapy          Subjective   General    Pt reports she is a little more achy but feels this may be due to the rain coming in today.   Precautions:   None   Pain    0  Post Treatment Pain Level 0    Objective   Good control requiring Less VC    Treatments:  Therapeutic exercise:  UEB 5' warm up  Supine alt chest press #5 15x2   Posterior fly #3 15x2   Dead bug 10x3   Sit to stand with ball press 10x3     Postrual control with PPT at wall 10x3   Postural control with shoulder contacting wall and PPT 10x3   Standing wall arm slides with back against the wall 10x3  - + focus on PPT  Seated SB roll out stretch 10x      Assessment   Assessment:    Pt tolerated session well focus on overall strengthening exercises with good form and control for stability, will continue as tolerated.     Plan:    Continue overall strength and mobility training - weight training.      OP EDUCATION:   Access Code [email or web address]     Goals:   Active       PT Problem       Pt will be 50% IND with HEP in 4 weeks in order to progress with therapy.        Start:  12/24/24    Expected End:  01/28/25            Pt will reduce pain levels to no more than 3/10 in 4 weeks in order to improve sleep and self care  tasks.        Start:  12/24/24    Expected End:  01/28/25            Pt will demonstrate full functional cervical ROM in 4 weeks for self care tasks.       Start:  12/24/24    Expected End:  01/28/25            Pt will demonstrate subjective improvement of ADLs and recreational activities through improved score of 10 on NDI in 4 weeks.        Start:  12/24/24    Expected End:  01/28/25               PT Problem       Pt will be 100% IND with HEP in 8 weeks in order to maintain progress with therapy.         Start:  12/24/24    Expected End:  02/27/25            Pt will reduce pain levels to no more than 1/10 in 8 weeks in order to improve sleep, cooking/cleaning and self care tasks.        Start:  12/24/24    Expected End:  02/27/25            Pt will improve B scapular/GH strength to 5/5 in 8 weeks in order to improve strength required to lift objects at home including groceries and to improve cleaning tasks.         Start:  12/24/24    Expected End:  02/27/25            Pt will demonstrate subjective improvement of ADLs and recreational activities through improved score of 0 on NDI in 8 weeks.        Start:  12/24/24    Expected End:  02/27/25

## 2025-06-27 ENCOUNTER — TREATMENT (OUTPATIENT)
Dept: PHYSICAL THERAPY | Facility: CLINIC | Age: 60
End: 2025-06-27
Payer: MEDICAID

## 2025-06-27 DIAGNOSIS — M54.2 NECK PAIN: ICD-10-CM

## 2025-06-27 DIAGNOSIS — M54.6 CHRONIC BILATERAL THORACIC BACK PAIN: ICD-10-CM

## 2025-06-27 DIAGNOSIS — G89.29 CHRONIC BILATERAL THORACIC BACK PAIN: ICD-10-CM

## 2025-06-27 PROCEDURE — 97140 MANUAL THERAPY 1/> REGIONS: CPT | Mod: GP | Performed by: PHYSICAL THERAPIST

## 2025-06-27 PROCEDURE — 97110 THERAPEUTIC EXERCISES: CPT | Mod: GP | Performed by: PHYSICAL THERAPIST

## 2025-06-27 NOTE — PROGRESS NOTES
Physical Therapy Treatment    Patient Name: Merle Joshua  MRN: [id number]  Today's Date: 6/27/2025  Time Calculation  Start Time: 0801  Stop Time: 0845  Time Calculation (min): 44 min  PT Therapeutic Procedures Time Entry  Therapeutic Exercise Time Entry: 20  Manual Therapy Time Entry: 24      Insurance:  Visit number: 18 of 20  Authorization info: AUTH AFTER 30 VISITS / 100% COVERAGE / 30V   Insurance Type: Payor: [employer] / Plan: [employer] / Product Type: *No Product type* /   Insurance Type: Payor: [employer] / Plan: [employer] / Product Type: *No Product type* /     Current Problem   1. Neck pain  Follow Up In Physical Therapy      2. Chronic bilateral thoracic back pain  Follow Up In Physical Therapy          Subjective   General    Pt reports she is feeling good today but is having some R knee pain.   Precautions:   None   Pain    Aching R knee   Post Treatment Pain Level same     Objective   R knee feels better with distraction   Some R knee laxity during PA mobilization     Treatments:  Therapeutic exercise:  UEB/LEB 5' warm up  Seated B LAQ with ball between knees 10x2, mint TB resistance 10x   Seated B Hamstring curls mint TB 10x2 B  S/l B Clam shells mint TB 10x3  S/l B SLR abduction mint TB 10x1    Manual Therapy:  Seated R Femur tibia distraction 7# ankle weight with AP/PA mobs   Supine R knee AP and PA mobs    Assessment   Assessment:    Pt tolerated session well focus on knee strengthening and mobilizations for pain relief. She reported feeling better with the ankle weight distraction and the mobilizations. During the supine PA mobilizations in the R knee showed to have some laxity which could be a sign for knee ligament laxity or a possible previous ligament injury that wasn't noticed before.     Plan:    Reassess    OP EDUCATION:   Access Code [email or web address]     Goals:   Active       PT Problem       Pt will be 50% IND with  HEP in 4 weeks in order to progress with therapy.        Start:  12/24/24    Expected End:  01/28/25            Pt will reduce pain levels to no more than 3/10 in 4 weeks in order to improve sleep and self care tasks.        Start:  12/24/24    Expected End:  01/28/25            Pt will demonstrate full functional cervical ROM in 4 weeks for self care tasks.       Start:  12/24/24    Expected End:  01/28/25            Pt will demonstrate subjective improvement of ADLs and recreational activities through improved score of 10 on NDI in 4 weeks.        Start:  12/24/24    Expected End:  01/28/25               PT Problem       Pt will be 100% IND with HEP in 8 weeks in order to maintain progress with therapy.         Start:  12/24/24    Expected End:  02/27/25            Pt will reduce pain levels to no more than 1/10 in 8 weeks in order to improve sleep, cooking/cleaning and self care tasks.        Start:  12/24/24    Expected End:  02/27/25            Pt will improve B scapular/GH strength to 5/5 in 8 weeks in order to improve strength required to lift objects at home including groceries and to improve cleaning tasks.         Start:  12/24/24    Expected End:  02/27/25            Pt will demonstrate subjective improvement of ADLs and recreational activities through improved score of 0 on NDI in 8 weeks.        Start:  12/24/24    Expected End:  02/27/25
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
## 2025-07-07 ENCOUNTER — APPOINTMENT (OUTPATIENT)
Dept: PRIMARY CARE | Facility: CLINIC | Age: 60
End: 2025-07-07
Payer: MEDICAID

## 2025-07-07 DIAGNOSIS — D64.9 ANEMIA, UNSPECIFIED TYPE: ICD-10-CM

## 2025-07-07 DIAGNOSIS — R79.89 ELEVATED FERRITIN: ICD-10-CM

## 2025-07-07 DIAGNOSIS — K21.9 GASTROESOPHAGEAL REFLUX DISEASE WITHOUT ESOPHAGITIS: ICD-10-CM

## 2025-07-07 DIAGNOSIS — F41.9 ANXIETY: ICD-10-CM

## 2025-07-07 DIAGNOSIS — G89.29 CHRONIC BILATERAL LOW BACK PAIN WITHOUT SCIATICA: ICD-10-CM

## 2025-07-07 DIAGNOSIS — M54.50 CHRONIC BILATERAL LOW BACK PAIN WITHOUT SCIATICA: ICD-10-CM

## 2025-07-07 DIAGNOSIS — F51.01 PRIMARY INSOMNIA: ICD-10-CM

## 2025-07-07 DIAGNOSIS — G43.909 MIGRAINE WITHOUT STATUS MIGRAINOSUS, NOT INTRACTABLE, UNSPECIFIED MIGRAINE TYPE: ICD-10-CM

## 2025-07-07 DIAGNOSIS — M54.2 NECK PAIN: Primary | ICD-10-CM

## 2025-07-10 ENCOUNTER — OFFICE VISIT (OUTPATIENT)
Dept: PRIMARY CARE | Facility: CLINIC | Age: 60
End: 2025-07-10
Payer: MEDICAID

## 2025-07-10 VITALS
OXYGEN SATURATION: 98 % | HEART RATE: 85 BPM | HEIGHT: 67 IN | BODY MASS INDEX: 29.7 KG/M2 | DIASTOLIC BLOOD PRESSURE: 76 MMHG | SYSTOLIC BLOOD PRESSURE: 110 MMHG | WEIGHT: 189.2 LBS

## 2025-07-10 DIAGNOSIS — M25.561 CHRONIC PAIN OF BOTH KNEES: ICD-10-CM

## 2025-07-10 DIAGNOSIS — D64.9 ANEMIA, UNSPECIFIED TYPE: ICD-10-CM

## 2025-07-10 DIAGNOSIS — G89.29 CHRONIC BILATERAL LOW BACK PAIN WITHOUT SCIATICA: ICD-10-CM

## 2025-07-10 DIAGNOSIS — G89.29 CHRONIC PAIN OF BOTH KNEES: ICD-10-CM

## 2025-07-10 DIAGNOSIS — E53.8 VITAMIN B12 DEFICIENCY: ICD-10-CM

## 2025-07-10 DIAGNOSIS — M54.50 CHRONIC BILATERAL LOW BACK PAIN WITHOUT SCIATICA: ICD-10-CM

## 2025-07-10 DIAGNOSIS — K21.9 GASTROESOPHAGEAL REFLUX DISEASE WITHOUT ESOPHAGITIS: ICD-10-CM

## 2025-07-10 DIAGNOSIS — G43.909 MIGRAINE WITHOUT STATUS MIGRAINOSUS, NOT INTRACTABLE, UNSPECIFIED MIGRAINE TYPE: ICD-10-CM

## 2025-07-10 DIAGNOSIS — M54.2 NECK PAIN: Primary | ICD-10-CM

## 2025-07-10 DIAGNOSIS — M25.562 CHRONIC PAIN OF BOTH KNEES: ICD-10-CM

## 2025-07-10 DIAGNOSIS — F41.9 ANXIETY: ICD-10-CM

## 2025-07-10 DIAGNOSIS — R79.89 ELEVATED FERRITIN: ICD-10-CM

## 2025-07-10 DIAGNOSIS — E55.9 VITAMIN D DEFICIENCY: ICD-10-CM

## 2025-07-10 LAB
25(OH)D3+25(OH)D2 SERPL-MCNC: 41 NG/ML (ref 30–100)
BASOPHILS # BLD AUTO: 58 CELLS/UL (ref 0–200)
BASOPHILS NFR BLD AUTO: 1.1 %
EOSINOPHIL # BLD AUTO: 111 CELLS/UL (ref 15–500)
EOSINOPHIL NFR BLD AUTO: 2.1 %
ERYTHROCYTE [DISTWIDTH] IN BLOOD BY AUTOMATED COUNT: 13.3 % (ref 11–15)
FERRITIN SERPL-MCNC: 123 NG/ML (ref 16–232)
HCT VFR BLD AUTO: 37.4 % (ref 35–45)
HGB BLD-MCNC: 12.1 G/DL (ref 11.7–15.5)
IRON SATN MFR SERPL: 25 % (CALC) (ref 16–45)
IRON SERPL-MCNC: 82 MCG/DL (ref 45–160)
LYMPHOCYTES # BLD AUTO: 1919 CELLS/UL (ref 850–3900)
LYMPHOCYTES NFR BLD AUTO: 36.2 %
MCH RBC QN AUTO: 28.4 PG (ref 27–33)
MCHC RBC AUTO-ENTMCNC: 32.4 G/DL (ref 32–36)
MCV RBC AUTO: 87.8 FL (ref 80–100)
MONOCYTES # BLD AUTO: 424 CELLS/UL (ref 200–950)
MONOCYTES NFR BLD AUTO: 8 %
NEUTROPHILS # BLD AUTO: 2788 CELLS/UL (ref 1500–7800)
NEUTROPHILS NFR BLD AUTO: 52.6 %
PLATELET # BLD AUTO: 260 THOUSAND/UL (ref 140–400)
PMV BLD REES-ECKER: 10.3 FL (ref 7.5–12.5)
RBC # BLD AUTO: 4.26 MILLION/UL (ref 3.8–5.1)
TIBC SERPL-MCNC: 325 MCG/DL (CALC) (ref 250–450)
TSH SERPL-ACNC: 2.11 MIU/L (ref 0.4–4.5)
VIT B12 SERPL-MCNC: 417 PG/ML (ref 200–1100)
WBC # BLD AUTO: 5.3 THOUSAND/UL (ref 3.8–10.8)

## 2025-07-10 PROCEDURE — 99214 OFFICE O/P EST MOD 30 MIN: CPT | Performed by: PHYSICIAN ASSISTANT

## 2025-07-10 PROCEDURE — 3008F BODY MASS INDEX DOCD: CPT | Performed by: PHYSICIAN ASSISTANT

## 2025-07-10 PROCEDURE — 1036F TOBACCO NON-USER: CPT | Performed by: PHYSICIAN ASSISTANT

## 2025-07-10 RX ORDER — METHOCARBAMOL 750 MG/1
750 TABLET, FILM COATED ORAL 3 TIMES DAILY
Qty: 270 TABLET | Refills: 0 | Status: SHIPPED | OUTPATIENT
Start: 2025-07-10 | End: 2025-10-08

## 2025-07-10 RX ORDER — SERTRALINE HYDROCHLORIDE 25 MG/1
25 TABLET, FILM COATED ORAL DAILY
Qty: 30 TABLET | Refills: 1 | Status: SHIPPED | OUTPATIENT
Start: 2025-07-10 | End: 2025-09-08

## 2025-07-10 RX ORDER — MELOXICAM 15 MG/1
15 TABLET ORAL DAILY
Qty: 90 TABLET | Refills: 0 | Status: SHIPPED | OUTPATIENT
Start: 2025-07-10 | End: 2025-10-08

## 2025-07-10 ASSESSMENT — ENCOUNTER SYMPTOMS
LOSS OF SENSATION IN FEET: 0
DEPRESSION: 0
OCCASIONAL FEELINGS OF UNSTEADINESS: 0

## 2025-07-10 ASSESSMENT — PATIENT HEALTH QUESTIONNAIRE - PHQ9
2. FEELING DOWN, DEPRESSED OR HOPELESS: NOT AT ALL
1. LITTLE INTEREST OR PLEASURE IN DOING THINGS: NOT AT ALL
SUM OF ALL RESPONSES TO PHQ9 QUESTIONS 1 AND 2: 0

## 2025-07-10 ASSESSMENT — PAIN SCALES - GENERAL: PAINLEVEL_OUTOF10: 0-NO PAIN

## 2025-07-10 NOTE — PATIENT INSTRUCTIONS
Please call the [facility] lab at [phone] to schedule an appointment for your labs.

## 2025-07-11 ENCOUNTER — TREATMENT (OUTPATIENT)
Dept: PHYSICAL THERAPY | Facility: CLINIC | Age: 60
End: 2025-07-11
Payer: MEDICAID

## 2025-07-11 DIAGNOSIS — M54.6 CHRONIC BILATERAL THORACIC BACK PAIN: ICD-10-CM

## 2025-07-11 DIAGNOSIS — M54.2 NECK PAIN: ICD-10-CM

## 2025-07-11 DIAGNOSIS — G89.29 CHRONIC BILATERAL THORACIC BACK PAIN: ICD-10-CM

## 2025-07-11 PROCEDURE — 97530 THERAPEUTIC ACTIVITIES: CPT | Mod: GP | Performed by: PHYSICAL THERAPIST

## 2025-08-14 ENCOUNTER — OFFICE VISIT (OUTPATIENT)
Dept: PRIMARY CARE | Facility: CLINIC | Age: 60
End: 2025-08-14
Payer: MEDICAID

## 2025-08-14 VITALS
DIASTOLIC BLOOD PRESSURE: 78 MMHG | SYSTOLIC BLOOD PRESSURE: 114 MMHG | HEIGHT: 67 IN | OXYGEN SATURATION: 99 % | BODY MASS INDEX: 28.41 KG/M2 | WEIGHT: 181 LBS | HEART RATE: 78 BPM

## 2025-08-14 DIAGNOSIS — F41.9 ANXIETY: ICD-10-CM

## 2025-08-14 DIAGNOSIS — G43.909 MIGRAINE WITHOUT STATUS MIGRAINOSUS, NOT INTRACTABLE, UNSPECIFIED MIGRAINE TYPE: ICD-10-CM

## 2025-08-14 DIAGNOSIS — L72.0 EPIDERMOID CYST OF FACE: ICD-10-CM

## 2025-08-14 DIAGNOSIS — G89.29 CHRONIC PAIN OF BOTH KNEES: ICD-10-CM

## 2025-08-14 DIAGNOSIS — K21.9 GASTROESOPHAGEAL REFLUX DISEASE WITHOUT ESOPHAGITIS: ICD-10-CM

## 2025-08-14 DIAGNOSIS — D64.9 ANEMIA, UNSPECIFIED TYPE: ICD-10-CM

## 2025-08-14 DIAGNOSIS — M54.50 CHRONIC BILATERAL LOW BACK PAIN WITHOUT SCIATICA: ICD-10-CM

## 2025-08-14 DIAGNOSIS — M25.561 CHRONIC PAIN OF BOTH KNEES: ICD-10-CM

## 2025-08-14 DIAGNOSIS — G89.29 CHRONIC BILATERAL LOW BACK PAIN WITHOUT SCIATICA: ICD-10-CM

## 2025-08-14 DIAGNOSIS — F51.01 PRIMARY INSOMNIA: ICD-10-CM

## 2025-08-14 DIAGNOSIS — J33.9 NASAL POLYP: ICD-10-CM

## 2025-08-14 DIAGNOSIS — M25.562 CHRONIC PAIN OF BOTH KNEES: ICD-10-CM

## 2025-08-14 DIAGNOSIS — J30.9 ALLERGIC RHINITIS, UNSPECIFIED SEASONALITY, UNSPECIFIED TRIGGER: ICD-10-CM

## 2025-08-14 DIAGNOSIS — M54.2 NECK PAIN: Primary | ICD-10-CM

## 2025-08-14 PROCEDURE — 3008F BODY MASS INDEX DOCD: CPT | Performed by: PHYSICIAN ASSISTANT

## 2025-08-14 PROCEDURE — 99214 OFFICE O/P EST MOD 30 MIN: CPT | Performed by: PHYSICIAN ASSISTANT

## 2025-08-14 PROCEDURE — 1036F TOBACCO NON-USER: CPT | Performed by: PHYSICIAN ASSISTANT

## 2025-08-14 RX ORDER — CETIRIZINE HYDROCHLORIDE 10 MG/1
10 TABLET ORAL DAILY
Qty: 90 TABLET | Refills: 0 | Status: SHIPPED | OUTPATIENT
Start: 2025-08-14 | End: 2025-11-12

## 2025-08-14 ASSESSMENT — PAIN SCALES - GENERAL: PAINLEVEL_OUTOF10: 0-NO PAIN

## 2025-08-14 ASSESSMENT — ENCOUNTER SYMPTOMS
DEPRESSION: 0
LOSS OF SENSATION IN FEET: 0
OCCASIONAL FEELINGS OF UNSTEADINESS: 0

## 2025-09-09 ENCOUNTER — APPOINTMENT (OUTPATIENT)
Dept: OTOLARYNGOLOGY | Facility: CLINIC | Age: 60
End: 2025-09-09
Payer: MEDICAID

## 2025-09-22 ENCOUNTER — APPOINTMENT (OUTPATIENT)
Facility: CLINIC | Age: 60
End: 2025-09-22
Payer: MEDICAID

## 2025-10-06 ENCOUNTER — APPOINTMENT (OUTPATIENT)
Dept: NEUROLOGY | Facility: CLINIC | Age: 60
End: 2025-10-06
Payer: MEDICAID

## 2026-01-14 ENCOUNTER — APPOINTMENT (OUTPATIENT)
Dept: PRIMARY CARE | Facility: CLINIC | Age: 61
End: 2026-01-14
Payer: MEDICAID